# Patient Record
Sex: FEMALE | Race: WHITE | NOT HISPANIC OR LATINO | Employment: UNEMPLOYED | URBAN - METROPOLITAN AREA
[De-identification: names, ages, dates, MRNs, and addresses within clinical notes are randomized per-mention and may not be internally consistent; named-entity substitution may affect disease eponyms.]

---

## 2017-01-02 ENCOUNTER — HOSPITAL ENCOUNTER (OUTPATIENT)
Dept: RADIOLOGY | Facility: MEDICAL CENTER | Age: 37
End: 2017-01-02

## 2017-01-03 ENCOUNTER — HOSPITAL ENCOUNTER (OUTPATIENT)
Dept: RADIOLOGY | Facility: MEDICAL CENTER | Age: 37
End: 2017-01-03

## 2017-01-16 ENCOUNTER — APPOINTMENT (OUTPATIENT)
Dept: INTERNAL MEDICINE | Facility: MEDICAL CENTER | Age: 37
End: 2017-01-16
Payer: MEDICAID

## 2017-02-01 ENCOUNTER — OFFICE VISIT (OUTPATIENT)
Dept: INTERNAL MEDICINE | Facility: MEDICAL CENTER | Age: 37
End: 2017-02-01
Payer: MEDICAID

## 2017-02-01 VITALS
SYSTOLIC BLOOD PRESSURE: 131 MMHG | HEIGHT: 70 IN | BODY MASS INDEX: 32.78 KG/M2 | DIASTOLIC BLOOD PRESSURE: 87 MMHG | OXYGEN SATURATION: 96 % | TEMPERATURE: 99.5 F | HEART RATE: 93 BPM | WEIGHT: 229 LBS

## 2017-02-01 DIAGNOSIS — M87.00 AVASCULAR NECROSIS OF BONE (HCC): ICD-10-CM

## 2017-02-01 PROCEDURE — 99204 OFFICE O/P NEW MOD 45 MIN: CPT | Mod: GC | Performed by: INTERNAL MEDICINE

## 2017-02-01 RX ORDER — DEXAMETHASONE 0.5 MG/1
1 TABLET ORAL ONCE
Qty: 2 TAB | Refills: 0 | Status: SHIPPED | OUTPATIENT
Start: 2017-02-01 | End: 2017-02-01

## 2017-02-01 RX ORDER — OXYCODONE AND ACETAMINOPHEN 10; 325 MG/1; MG/1
1-2 TABLET ORAL EVERY 4 HOURS PRN
COMMUNITY
End: 2018-06-22

## 2017-02-01 ASSESSMENT — PATIENT HEALTH QUESTIONNAIRE - PHQ9
5. POOR APPETITE OR OVEREATING: 3 - NEARLY EVERY DAY
SUM OF ALL RESPONSES TO PHQ QUESTIONS 1-9: 24
CLINICAL INTERPRETATION OF PHQ2 SCORE: 6

## 2017-02-01 ASSESSMENT — PAIN SCALES - GENERAL: PAINLEVEL: 7=MODERATE-SEVERE PAIN

## 2017-02-01 NOTE — PATIENT INSTRUCTIONS
"Stress  Stress-related medical problems are becoming increasingly common.  The body has a built-in physical response to stressful situations. Faced with pressure, challenge or danger, we need to react quickly. Our bodies release hormones such as cortisol and adrenaline to help do this. These hormones are part of the \"fight or flight\" response and affect the metabolic rate, heart rate and blood pressure, resulting in a heightened, stressed state that prepares the body for optimum performance in dealing with a stressful situation.  It is likely that early man required these mechanisms to stay alive, but usually modern stresses do not call for this, and the same hormones released in today's world can damage health and reduce coping ability.  CAUSES  · Pressure to perform at work, at school or in sports.  · Threats of physical violence.  · Money worries.  · Arguments.  · Family conflicts.  · Divorce or separation from significant other.  · Bereavement.  · New job or unemployment.  · Changes in location.  · Alcohol or drug abuse.  SOMETIMES, THERE IS NO PARTICULAR REASON FOR DEVELOPING STRESS.  Almost all people are at risk of being stressed at some time in their lives. It is important to know that some stress is temporary and some is long term.  · Temporary stress will go away when a situation is resolved. Most people can cope with short periods of stress, and it can often be relieved by relaxing, taking a walk, chatting through issues with friends, or having a good night's sleep.  · Chronic (long-term, continuous) stress is much harder to deal with. It can be psychologically and emotionally damaging. It can be harmful both for an individual and for friends and family.  SYMPTOMS  Everyone reacts to stress differently. There are some common effects that help us recognize it. In times of extreme stress, people may:  · Shake uncontrollably.  · Breathe faster and deeper than normal (hyperventilate).  · Vomit.  · For people " with asthma, stress can trigger an attack.  · For some people, stress may trigger migraine headaches, ulcers, and body pain.  PHYSICAL EFFECTS OF STRESS MAY INCLUDE:  · Loss of energy.  · Skin problems.  · Aches and pains resulting from tense muscles, including neck ache, backache and tension headaches.  · Increased pain from arthritis and other conditions.  · Irregular heart beat (palpitations).  · Periods of irritability or anger.  · Apathy or depression.  · Anxiety (feeling uptight or worrying).  · Unusual behavior.  · Loss of appetite.  · Comfort eating.  · Lack of concentration.  · Loss of, or decreased, sex-drive.  · Increased smoking, drinking, or recreational drug use.  · For women, missed periods.  · Ulcers, joint pain, and muscle pain.  Post-traumatic stress is the stress caused by any serious accident, strong emotional damage, or extremely difficult or violent experience such as rape or war.  Post-traumatic stress victims can experience mixtures of emotions such as fear, shame, depression, guilt or anger. It may include recurrent memories or images that may be haunting. These feelings can last for weeks, months or even years after the traumatic event that triggered them. Specialized treatment, possibly with medicines and psychological therapies, is available.  If stress is causing physical symptoms, severe distress or making it difficult for you to function as normal, it is worth seeing your caregiver. It is important to remember that although stress is a usual part of life, extreme or prolonged stress can lead to other illnesses that will need treatment. It is better to visit a doctor sooner rather than later. Stress has been linked to the development of high blood pressure and heart disease, as well as insomnia and depression.  There is no diagnostic test for stress since everyone reacts to it differently. But a caregiver will be able to spot the physical symptoms, such  as:  · Headaches.  · Shingles.  · Ulcers.  Emotional distress such as intense worry, low mood or irritability should be detected when the doctor asks pertinent questions to identify any underlying problems that might be the cause. In case there are physical reasons for the symptoms, the doctor may also want to do some tests to exclude certain conditions.  If you feel that you are suffering from stress, try to identify the aspects of your life that are causing it. Sometimes you may not be able to change or avoid them, but even a small change can have a positive ripple effect. A simple lifestyle change can make all the difference.  STRATEGIES THAT CAN HELP DEAL WITH STRESS:  · Delegating or sharing responsibilities.  · Avoiding confrontations.  · Learning to be more assertive.  · Regular exercise.  · Avoid using alcohol or street drugs to cope.  · Eating a healthy, balanced diet, rich in fruit and vegetables and proteins.  · Finding humor or absurdity in stressful situations.  · Never taking on more than you know you can handle comfortably.  · Organizing your time better to get as much done as possible.  · Talking to friends or family and sharing your thoughts and fears.  · Listening to music or relaxation tapes.  · Tensing and then relaxing your muscles, starting at the toes and working up to the head and neck.  If you think that you would benefit from help, either in identifying the things that are causing your stress or in learning techniques to help you relax, see a caregiver who is capable of helping you with this. Rather than relying on medications, it is usually better to try and identify the things in your life that are causing stress and try to deal with them.  There are many techniques of managing stress including counseling, psychotherapy, aromatherapy, yoga, and exercise. Your caregiver can help you determine what is best for you.  Document Released: 03/09/2004 Document Revised: 03/11/2013 Document  Reviewed: 02/03/2009  ExitCare® Patient Information ©2014 [employer].
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
## 2017-02-01 NOTE — MR AVS SNAPSHOT
"Vivian Ptaeling   2017 2:15 PM   Office Visit   MRN: [id number]    Department:  [facility] - Internal Med   Dept Phone:  [phone]    Description:  Female : 1980   Provider:  Izzy Ann M.D.           Reason for Visit     New Patient anxiety/HTN/cough/depression      Allergies as of 2017     Allergen Noted Reactions    Ativan 2017       Black out     Haldol [Haloperidol] 2017       Black out     Pcn [Penicillins] 2009       Pcn [Penicillins] 2009         You were diagnosed with     Avascular necrosis of bone (CMS-HCC)   [504587]         Vital Signs     Blood Pressure Pulse Temperature Height Weight Body Mass Index    131/87 mmHg 93 37.5 °C (99.5 °F) 1.778 m (5' 10\") 103.874 kg (229 lb) 32.86 kg/m2    Oxygen Saturation Breastfeeding? Smoking Status             96% No Current Every Day Smoker         Basic Information     Date Of Birth Sex Race Ethnicity Preferred Language    1980 Female White Unknown English      Your appointments     Mar 08, 2017  1:15 PM   Established Patient with Izzy Ann M.D.   [facility] - Internal Medicine (--)    [address]  [address] [address] [phone]           You will be receiving a confirmation call a few days before your appointment from our automated call confirmation system.              Problem List              ICD-10-CM Priority Class Noted - Resolved    Avascular necrosis of bone (CMS-HCC) M87.00   2017 - Present      Health Maintenance        Date Due Completion Dates    IMM DTaP/Tdap/Td Vaccine (6 - Tdap) 3/27/1997 3/26/1997, 3/17/1987, 1984, 1983, 1/3/1983, 1980    PAP SMEAR 2003 ---    IMM INFLUENZA (1) 2016 ---            Current Immunizations     Dtap Vaccine 3/17/1987, 1984, 1983, 1/3/1983, 1980    HIB Vaccine(PEDVAX) 1986    MMR Vaccine 3/26/1997, 1983    OPV - Historical Data 3/17/1987, 1983, 1/3/1983, " 1980    TD Vaccine 3/26/1997      Below and/or attached are the medications your provider expects you to take. Review all of your home medications and newly ordered medications with your provider and/or pharmacist. Follow medication instructions as directed by your provider and/or pharmacist. Please keep your medication list with you and share with your provider. Update the information when medications are discontinued, doses are changed, or new medications (including over-the-counter products) are added; and carry medication information at all times in the event of emergency situations     Allergies:  ATIVAN - (reactions not documented)     HALDOL - (reactions not documented)     PCN - (reactions not documented)     PCN - (reactions not documented)               Medications  Valid as of: February 01, 2017 -  3:21 PM    Generic Name Brand Name Tablet Size Instructions for use    ALPRAZolam (Tab) XANAX 2 MG Take 2 mg by mouth at bedtime as needed for Sleep.        Dexamethasone (Tab) DECADRON 0.5 MG Take 2 Tabs by mouth Once for 1 dose.        Diclofenac Sodium (Gel) Diclofenac Sodium 1 % Apply  to skin as directed.        Oxycodone-Acetaminophen (Tab) PERCOCET-10  MG Take 1-2 Tabs by mouth every four hours as needed for Severe Pain.        .                 Medicines prescribed today were sent to:     [facility] [address] [place] [id number]    Phone: [phone] Fax: [phone]    Open 24 Hours?: No      Medication refill instructions:       If your prescription bottle indicates you have medication refills left, it is not necessary to call your provider’s office. Please contact your pharmacy and they will refill your medication.    If your prescription bottle indicates you do not have any refills left, you may request refills at any time through one of the following ways: The online [employer] system (except Urgent Care), by calling your provider’s  "office, or by asking your pharmacy to contact your provider’s office with a refill request. Medication refills are processed only during regular business hours and may not be available until the next business day. Your provider may request additional information or to have a follow-up visit with you prior to refilling your medication.   *Please Note: Medication refills are assigned a new Rx number when refilled electronically. Your pharmacy may indicate that no refills were authorized even though a new prescription for the same medication is available at the pharmacy. Please request the medicine by name with the pharmacy before contacting your provider for a refill.        Referral     A referral request has been sent to our patient care coordination department. Please allow 3-5 business days for us to process this request and contact you either by phone or mail. If you do not hear from us by the 5th business day, please call us at [phone].        Instructions    Stress  Stress-related medical problems are becoming increasingly common.  The body has a built-in physical response to stressful situations. Faced with pressure, challenge or danger, we need to react quickly. Our bodies release hormones such as cortisol and adrenaline to help do this. These hormones are part of the \"fight or flight\" response and affect the metabolic rate, heart rate and blood pressure, resulting in a heightened, stressed state that prepares the body for optimum performance in dealing with a stressful situation.  It is likely that early man required these mechanisms to stay alive, but usually modern stresses do not call for this, and the same hormones released in today's world can damage health and reduce coping ability.  CAUSES  · Pressure to perform at work, at school or in sports.  · Threats of physical violence.  · Money worries.  · Arguments.  · Family conflicts.  · Divorce or separation from significant " other.  · Bereavement.  · New job or unemployment.  · Changes in location.  · Alcohol or drug abuse.  SOMETIMES, THERE IS NO PARTICULAR REASON FOR DEVELOPING STRESS.  Almost all people are at risk of being stressed at some time in their lives. It is important to know that some stress is temporary and some is long term.  · Temporary stress will go away when a situation is resolved. Most people can cope with short periods of stress, and it can often be relieved by relaxing, taking a walk, chatting through issues with friends, or having a good night's sleep.  · Chronic (long-term, continuous) stress is much harder to deal with. It can be psychologically and emotionally damaging. It can be harmful both for an individual and for friends and family.  SYMPTOMS  Everyone reacts to stress differently. There are some common effects that help us recognize it. In times of extreme stress, people may:  · Shake uncontrollably.  · Breathe faster and deeper than normal (hyperventilate).  · Vomit.  · For people with asthma, stress can trigger an attack.  · For some people, stress may trigger migraine headaches, ulcers, and body pain.  PHYSICAL EFFECTS OF STRESS MAY INCLUDE:  · Loss of energy.  · Skin problems.  · Aches and pains resulting from tense muscles, including neck ache, backache and tension headaches.  · Increased pain from arthritis and other conditions.  · Irregular heart beat (palpitations).  · Periods of irritability or anger.  · Apathy or depression.  · Anxiety (feeling uptight or worrying).  · Unusual behavior.  · Loss of appetite.  · Comfort eating.  · Lack of concentration.  · Loss of, or decreased, sex-drive.  · Increased smoking, drinking, or recreational drug use.  · For women, missed periods.  · Ulcers, joint pain, and muscle pain.  Post-traumatic stress is the stress caused by any serious accident, strong emotional damage, or extremely difficult or violent experience such as rape or war.  Post-traumatic stress  victims can experience mixtures of emotions such as fear, shame, depression, guilt or anger. It may include recurrent memories or images that may be haunting. These feelings can last for weeks, months or even years after the traumatic event that triggered them. Specialized treatment, possibly with medicines and psychological therapies, is available.  If stress is causing physical symptoms, severe distress or making it difficult for you to function as normal, it is worth seeing your caregiver. It is important to remember that although stress is a usual part of life, extreme or prolonged stress can lead to other illnesses that will need treatment. It is better to visit a doctor sooner rather than later. Stress has been linked to the development of high blood pressure and heart disease, as well as insomnia and depression.  There is no diagnostic test for stress since everyone reacts to it differently. But a caregiver will be able to spot the physical symptoms, such as:  · Headaches.  · Shingles.  · Ulcers.  Emotional distress such as intense worry, low mood or irritability should be detected when the doctor asks pertinent questions to identify any underlying problems that might be the cause. In case there are physical reasons for the symptoms, the doctor may also want to do some tests to exclude certain conditions.  If you feel that you are suffering from stress, try to identify the aspects of your life that are causing it. Sometimes you may not be able to change or avoid them, but even a small change can have a positive ripple effect. A simple lifestyle change can make all the difference.  STRATEGIES THAT CAN HELP DEAL WITH STRESS:  · Delegating or sharing responsibilities.  · Avoiding confrontations.  · Learning to be more assertive.  · Regular exercise.  · Avoid using alcohol or street drugs to cope.  · Eating a healthy, balanced diet, rich in fruit and vegetables and proteins.  · Finding humor or absurdity in  stressful situations.  · Never taking on more than you know you can handle comfortably.  · Organizing your time better to get as much done as possible.  · Talking to friends or family and sharing your thoughts and fears.  · Listening to music or relaxation tapes.  · Tensing and then relaxing your muscles, starting at the toes and working up to the head and neck.  If you think that you would benefit from help, either in identifying the things that are causing your stress or in learning techniques to help you relax, see a caregiver who is capable of helping you with this. Rather than relying on medications, it is usually better to try and identify the things in your life that are causing stress and try to deal with them.  There are many techniques of managing stress including counseling, psychotherapy, aromatherapy, yoga, and exercise. Your caregiver can help you determine what is best for you.  Document Released: 03/09/2004 Document Revised: 03/11/2013 Document Reviewed: 02/03/2009  ExitCare® Patient Information ©2014 [employer].            [employer] Access Code: Activation code not generated  Current ModusP Status: Active

## 2017-02-13 DIAGNOSIS — D68.59 HYPERCOAGULABLE STATE (HCC): ICD-10-CM

## 2017-03-08 ENCOUNTER — OFFICE VISIT (OUTPATIENT)
Dept: HEMATOLOGY ONCOLOGY | Facility: MEDICAL CENTER | Age: 37
End: 2017-03-08
Payer: MEDICAID

## 2017-03-08 ENCOUNTER — OFFICE VISIT (OUTPATIENT)
Dept: INTERNAL MEDICINE | Facility: MEDICAL CENTER | Age: 37
End: 2017-03-08
Payer: MEDICAID

## 2017-03-08 VITALS
SYSTOLIC BLOOD PRESSURE: 142 MMHG | TEMPERATURE: 99.3 F | DIASTOLIC BLOOD PRESSURE: 102 MMHG | HEART RATE: 108 BPM | BODY MASS INDEX: 34.28 KG/M2 | OXYGEN SATURATION: 94 % | HEIGHT: 68 IN | RESPIRATION RATE: 12 BRPM | WEIGHT: 226.2 LBS

## 2017-03-08 VITALS
DIASTOLIC BLOOD PRESSURE: 83 MMHG | OXYGEN SATURATION: 94 % | BODY MASS INDEX: 32.78 KG/M2 | TEMPERATURE: 99.2 F | HEART RATE: 98 BPM | HEIGHT: 70 IN | WEIGHT: 229 LBS | SYSTOLIC BLOOD PRESSURE: 119 MMHG | RESPIRATION RATE: 20 BRPM

## 2017-03-08 DIAGNOSIS — M87.00 AVASCULAR NECROSIS OF BONE (HCC): ICD-10-CM

## 2017-03-08 DIAGNOSIS — M87.9 OSTEONECROSIS (HCC): ICD-10-CM

## 2017-03-08 PROCEDURE — 99214 OFFICE O/P EST MOD 30 MIN: CPT | Mod: GC | Performed by: INTERNAL MEDICINE

## 2017-03-08 PROCEDURE — 99245 OFF/OP CONSLTJ NEW/EST HI 55: CPT | Performed by: INTERNAL MEDICINE

## 2017-03-08 RX ORDER — ONDANSETRON HYDROCHLORIDE 8 MG/1
TABLET, FILM COATED ORAL
Status: ON HOLD | COMMUNITY
Start: 2016-12-12 | End: 2017-03-14

## 2017-03-08 RX ORDER — ASPIRIN 81 MG/1
TABLET, CHEWABLE ORAL
Status: ON HOLD | COMMUNITY
Start: 2017-01-10 | End: 2017-03-14

## 2017-03-08 RX ORDER — LITHIUM CARBONATE 300 MG/1
CAPSULE ORAL
Status: ON HOLD | COMMUNITY
Start: 2016-12-10 | End: 2017-03-14

## 2017-03-08 RX ORDER — PROCHLORPERAZINE MALEATE 10 MG
TABLET ORAL
COMMUNITY
Start: 2017-02-09

## 2017-03-08 RX ORDER — IPRATROPIUM BROMIDE AND ALBUTEROL SULFATE 2.5; .5 MG/3ML; MG/3ML
SOLUTION RESPIRATORY (INHALATION)
Status: ON HOLD | COMMUNITY
Start: 2017-01-10 | End: 2017-03-14

## 2017-03-08 RX ORDER — SODIUM CHLORIDE 9 MG/ML
INJECTION, SOLUTION INTRAVENOUS
COMMUNITY
Start: 2017-01-10 | End: 2017-04-13

## 2017-03-08 RX ORDER — AZITHROMYCIN 250 MG/1
TABLET, FILM COATED ORAL
Status: ON HOLD | COMMUNITY
Start: 2017-01-10 | End: 2017-03-14

## 2017-03-08 RX ORDER — GABAPENTIN 800 MG/1
TABLET ORAL
Status: ON HOLD | COMMUNITY
Start: 2016-12-10 | End: 2017-03-14

## 2017-03-08 RX ORDER — DOXYCYCLINE HYCLATE 100 MG/1
CAPSULE ORAL
Status: ON HOLD | COMMUNITY
Start: 2017-02-09 | End: 2017-03-14

## 2017-03-08 ASSESSMENT — PAIN SCALES - GENERAL
PAINLEVEL: 5=MODERATE PAIN
PAINLEVEL: 5=MODERATE PAIN

## 2017-03-08 NOTE — MR AVS SNAPSHOT
"Vivian Pateling   3/8/2017 1:15 PM   Office Visit   MRN: [id number]    Department:  [facility]   Dept Phone:  [phone]    Description:  Female : 1980   Provider:  Izzy Ann M.D.           Reason for Visit     Follow-Up           Allergies as of 3/8/2017     Allergen Noted Reactions    Ativan 2017       Black out     Haldol [Haloperidol] 2017       Black out     Pcn [Penicillins] 2009       Pcn [Penicillins] 2009         You were diagnosed with     Avascular necrosis of bone (CMS-HCC)   [559165]         Vital Signs     Blood Pressure Pulse Temperature Respirations Height Weight    119/83 mmHg 98 37.3 °C (99.2 °F) 20 1.778 m (5' 10\") 103.874 kg (229 lb)    Body Mass Index Oxygen Saturation Smoking Status             32.86 kg/m2 94% Current Every Day Smoker         Basic Information     Date Of Birth Sex Race Ethnicity Preferred Language    1980 Female White Unknown English      Your appointments     Mar 08, 2017  3:00 PM   ONCOLOGY NEW PATIENT 60 MIN with David Hall M.D.   [facility] (--)    [address] [place] [address], [address]  [facility] [address] [phone]              Problem List              ICD-10-CM Priority Class Noted - Resolved    Avascular necrosis of bone (CMS-HCC) M87.00   2017 - Present      Health Maintenance        Date Due Completion Dates    IMM DTaP/Tdap/Td Vaccine (6 - Tdap) 3/27/1997 3/26/1997, 3/17/1987, 1984, 1983, 1/3/1983, 1982    PAP SMEAR 2003 ---    IMM INFLUENZA (1) 2016 ---            Current Immunizations     Dtap Vaccine 3/17/1987, 1984, 1983, 1/3/1983, 1982    HIB Vaccine(PEDVAX) 1986    MMR Vaccine 3/26/1997, 1983    OPV - Historical Data 3/17/1987, 1983, 1/3/1983, 1982    TD Vaccine 3/26/1997      Below and/or attached are the medications your provider expects you to take. Review all of your home medications and newly ordered medications with your " provider and/or pharmacist. Follow medication instructions as directed by your provider and/or pharmacist. Please keep your medication list with you and share with your provider. Update the information when medications are discontinued, doses are changed, or new medications (including over-the-counter products) are added; and carry medication information at all times in the event of emergency situations     Allergies:  ATIVAN - (reactions not documented)     HALDOL - (reactions not documented)     PCN - (reactions not documented)     PCN - (reactions not documented)               Medications  Valid as of: March 08, 2017 -  2:00 PM    Generic Name Brand Name Tablet Size Instructions for use    ALPRAZolam (Tab) XANAX 2 MG Take 2 mg by mouth at bedtime as needed for Sleep.        Oxycodone-Acetaminophen (Tab) PERCOCET-10  MG Take 1-2 Tabs by mouth every four hours as needed for Severe Pain.        .                 Medicines prescribed today were sent to:     [facility] [address] [place] [address]    Phone: [phone] Fax: [phone]    Open 24 Hours?: No      Medication refill instructions:       If your prescription bottle indicates you have medication refills left, it is not necessary to call your provider’s office. Please contact your pharmacy and they will refill your medication.    If your prescription bottle indicates you do not have any refills left, you may request refills at any time through one of the following ways: The online [employer] system (except Urgent Care), by calling your provider’s office, or by asking your pharmacy to contact your provider’s office with a refill request. Medication refills are processed only during regular business hours and may not be available until the next business day. Your provider may request additional information or to have a follow-up visit with you prior to refilling your medication.   *Please Note:  Medication refills are assigned a new Rx number when refilled electronically. Your pharmacy may indicate that no refills were authorized even though a new prescription for the same medication is available at the pharmacy. Please request the medicine by name with the pharmacy before contacting your provider for a refill.        Instructions    Pain Medicine Instructions  HOW CAN PAIN MEDICINE AFFECT ME?  You were given a prescription for pain medicine. This medicine may make you tired or drowsy and may affect your ability to think clearly. Pain medicine may also affect your ability to drive or perform certain physical activities. It may not be possible to make all of your pain go away, but you should be comfortable enough to move, breathe, and take care of yourself.  HOW OFTEN SHOULD I TAKE PAIN MEDICINE AND HOW MUCH SHOULD I TAKE?  · Take pain medicine only as directed by your health care provider and only as needed for pain.  · You do not need to take pain medicine if you are not having pain, unless directed by your health care provider.  · You can take less than the prescribed dose if you find that a smaller amount of medicine controls your pain.  WHAT RESTRICTIONS DO I HAVE WHILE TAKING PAIN MEDICINE?  Follow these instructions after you start taking pain medicine, while you are taking the medicine, and for 8 hours after you stop taking the medicine:  · Do not drive.  · Do not operate machinery.  · Do not operate power tools.  · Do not sign legal documents.  · Do not drink alcohol.  · Do not take sleeping pills.  · Do not supervise children by yourself.  · Do not participate in activities that require climbing or being in high places.  · Do not enter a body of water--such as a lake, river, ocean, spa, or swimming pool--without an adult nearby who can monitor and help you.  HOW CAN I KEEP OTHERS SAFE WHILE I AM TAKING PAIN MEDICINE?  · Store your pain medicine as directed by your health care provider. Make sure  that it is placed where children and pets cannot reach it.  · Never share your pain medicine with anyone.  · Do not save any leftover pills. If you have any leftover pain medicine, get rid of it or destroy it as directed by your health care provider.  WHAT ELSE DO I NEED TO KNOW ABOUT TAKING PAIN MEDICINE?  · Use a stool softener if you become constipated from your pain medicine. Increasing your intake of fruits and vegetables will also help with constipation.  · Write down the times when you take your pain medicine. Look at the times before you take your next dose of medicine. It is easy to become confused while on pain medicine. Recording the times helps you to avoid an overdose.  · If your pain is severe, do not try to treat it yourself by taking more pills than instructed on your prescription. Contact your health care provider for help.  · You may have been prescribed a pain medicine that contains acetaminophen. Do not take any other acetaminophen while taking this medicine. An overdose of acetaminophen can result in severe liver damage. Acetaminophen is found in many over-the-counter (OTC) and prescription medicines. If you are taking any medicines in addition to your pain medicine, check the active ingredients on those medicines to see if acetaminophen is listed.  WHEN SHOULD I CALL MY HEALTH CARE PROVIDER?  · Your medicine is not helping to make the pain go away.  · You vomit or have diarrhea shortly after taking the medicine.  · You develop new pain in areas that did not hurt before.  · You have an allergic reaction to your medicine. This may include:  ¨ Itchiness.  ¨ Swelling.  ¨ Dizziness.  ¨ Developing a new rash.  WHEN SHOULD I CALL 911 OR GO TO THE EMERGENCY ROOM?  · You feel dizzy or you faint.  · You are very confused or disoriented.  · You repeatedly vomit.  · Your skin or lips turn pale or bluish in color.  · You have shortness of breath or you are breathing much more slowly than usual.  · You have  a severe allergic reaction to your medicine. This includes:  ¨ Developing tongue swelling.  ¨ Having difficulty breathing.     This information is not intended to replace advice given to you by your health care provider. Make sure you discuss any questions you have with your health care provider.     Document Released: 03/26/2002 Document Revised: 05/03/2016 Document Reviewed: 10/22/2015  [employer] Interactive Patient Education ©2016 Elsevier Inc.            [employer] Access Code: Activation code not generated  Current [employer] Status: Active          Quit Tobacco Information     Do you want to quit using tobacco?    Quitting tobacco decreases risks of cancer, heart and lung disease, increases life expectancy, improves sense of taste and smell, and increases spending money, among other benefits.    If you are thinking about quitting, we can help.  • [facility] Quit Tobacco Program: 715.390.3468  o Program occurs weekly for four weeks and includes pharmacist consultation on products to support quitting smoking or chewing tobacco. A provider referral is needed for pharmacist consultation.  • Tobacco Users Help Hotline: 2-833-QUITNOW (376-9972) or https://[place].quitlogix.org/  o Free, confidential telephone and online coaching for [place] residents. Sessions are designed on a schedule that is convenient for you. Eligible clients receive free nicotine replacement therapy.  • Nationally: www.smokefree.gov  o Information and professional assistance to support both immediate and long-term needs as you become, and remain, a non-smoker. Smokefree.gov allows you to choose the help that best fits your needs.

## 2017-03-08 NOTE — PROGRESS NOTES
"      Established Patient    Vivian Meredith presents today with the following:    CC: Follow-up for avascular necrosis    HPI: 36-year-old female here for follow-up of avascular necrosis of unknown cause. Please refer to previous office visit note for further details. Patient has a long-standing history of avascular necrosis and has seen multiple specialists and has had extensive workup done in the past which is failed to elicit any cause. Last time she was in the office we ordered PTH and dexamethasone suppression test which has been done but we don't have the results yet. She has an appointment with hematologist at 3 PM today. She denies anything new that has happened in the interim. The only new thing is that she's been more drowsy than before. She is on the same pain medication regimen that she was on before but for some reason she has been feeling more sleepy especially after she eats meals.    Patient Active Problem List    Diagnosis Date Noted   • Avascular necrosis of bone (CMS-HCC) 02/01/2017       Current Outpatient Prescriptions   Medication Sig Dispense Refill   • oxycodone-acetaminophen (PERCOCET-10)  MG Tab Take 1-2 Tabs by mouth every four hours as needed for Severe Pain.     • alprazolam (XANAX) 2 MG tablet Take 2 mg by mouth at bedtime as needed for Sleep.       No current facility-administered medications for this visit.       ROS: As per HPI.     /83 mmHg  Pulse 98  Temp(Src) 37.3 °C (99.2 °F)  Resp 20  Ht 1.778 m (5' 10\")  Wt 103.874 kg (229 lb)  BMI 32.86 kg/m2  SpO2 94%    Physical Exam   Constitutional:  oriented to person, place, and time. Very drowsy.  Cardiovascular: Normal rate, regular rhythm and normal heart sounds.  Exam reveals no gallop and no friction rub.  No murmur heard.  Pulmonary/Chest: Breath sounds normal. Chest wall is not dull to percussion.     Assessment and Plan    1. Avascular necrosis of bone (CMS-HCC)  Will wait for test results (PTH and dexamethasone " suppression test).  Has appointment with hematology. Would appreciate their recommendations. It does appear that she has some clotting disorder that is causing body wide bone infarcts. She also mentioned today that she had difficulty getting her blood drawn because her blood would keep clotting in the tubes.      Followup: Schedule follow-up once we have her test results back.      Signed by: Izzy Ann M.D.

## 2017-03-08 NOTE — MR AVS SNAPSHOT
"        Vivian Patelmeera   3/8/2017 3:00 PM   Office Visit   MRN: [id number]    Department:  [facility]   Dept Phone:  [phone]    Description:  Female : 1980   Provider:  David Hall M.D.           Reason for Visit     Establish Care           Allergies as of 3/8/2017     Allergen Noted Reactions    Ativan 2017       Black out     Haldol [Haloperidol] 2017       Black out     Pcn [Penicillins] 2009       Pcn [Penicillins] 2009         You were diagnosed with     Osteonecrosis (CMS-[facility])   [004153]         Vital Signs     Blood Pressure Pulse Temperature Respirations Height Weight    142/102 mmHg 108 37.4 °C (99.3 °F) 12 1.727 m (5' 8\") 102.604 kg (226 lb 3.2 oz)    Body Mass Index Oxygen Saturation Smoking Status             34.40 kg/m2 94% Current Every Day Smoker         Basic Information     Date Of Birth Sex Race Ethnicity Preferred Language    1980 Female White Unknown English      Your appointments     Mar 28, 2017  2:40 PM   ONCOLOGY EST PATIENT 30 MIN with David Hall M.D.   [facility] (--)    [address] [place] [address], [address]  [facility] [address] [phone]              Problem List              ICD-10-CM Priority Class Noted - Resolved    Avascular necrosis of bone (CMS-[facility]) M87.00   2017 - Present      Health Maintenance        Date Due Completion Dates    IMM DTaP/Tdap/Td Vaccine (6 - Tdap) 3/27/1997 3/26/1997, 3/17/1987, 1984, 1983, 1/3/1983, 1982    PAP SMEAR 2003 ---    IMM INFLUENZA (1) 2016 ---            Current Immunizations     Dtap Vaccine 3/17/1987, 1984, 1983, 1/3/1983, 1982    HIB Vaccine(PEDVAX) 1986    MMR Vaccine 3/26/1997, 1983    OPV - Historical Data 3/17/1987, 1983, 1/3/1983, 1982    TD Vaccine 3/26/1997      Below and/or attached are the medications your provider expects you to take. Review all of your home medications and newly ordered medications with your " provider and/or pharmacist. Follow medication instructions as directed by your provider and/or pharmacist. Please keep your medication list with you and share with your provider. Update the information when medications are discontinued, doses are changed, or new medications (including over-the-counter products) are added; and carry medication information at all times in the event of emergency situations     Allergies:  ATIVAN - (reactions not documented)     HALDOL - (reactions not documented)     PCN - (reactions not documented)     PCN - (reactions not documented)               Medications  Valid as of: March 08, 2017 -  5:01 PM    Generic Name Brand Name Tablet Size Instructions for use    ALPRAZolam (Tab) XANAX 2 MG Take 2 mg by mouth at bedtime as needed for Sleep.        Aspirin (Chew Tab) ASPIRIN LOW STRENGTH 81 MG         Azithromycin (Tab) ZITHROMAX 250 MG         Diclofenac Sodium (Gel) VOLTAREN 1 %         Doxycycline Hyclate (Cap) VIBRAMYCIN 100 MG         Gabapentin (Tab) NEURONTIN 800 MG         Hydrocod Polst-Chlorphen Polst (Suspension Extended Release) TUSSIONEX 10-8 MG/5ML         Ipratropium-Albuterol (Solution) DUONEB 0.5-2.5 (3) MG/3ML         Lithium Carbonate (Cap) lithium carbonate 300 MG         Ondansetron HCl (Tab) ZOFRAN 8 MG         Oxycodone-Acetaminophen (Tab) PERCOCET-10  MG Take 1-2 Tabs by mouth every four hours as needed for Severe Pain.        Prochlorperazine Maleate (Tab) COMPAZINE 10 MG         Sodium Chloride (Solution) NS          .                 Medicines prescribed today were sent to:     [facility] [address] [place] [address]    Phone: [phone] Fax: [phone]    Open 24 Hours?: No      Medication refill instructions:       If your prescription bottle indicates you have medication refills left, it is not necessary to call your provider’s office. Please contact your pharmacy and they will refill your  medication.    If your prescription bottle indicates you do not have any refills left, you may request refills at any time through one of the following ways: The online [employer] system (except Urgent Care), by calling your provider’s office, or by asking your pharmacy to contact your provider’s office with a refill request. Medication refills are processed only during regular business hours and may not be available until the next business day. Your provider may request additional information or to have a follow-up visit with you prior to refilling your medication.   *Please Note: Medication refills are assigned a new Rx number when refilled electronically. Your pharmacy may indicate that no refills were authorized even though a new prescription for the same medication is available at the pharmacy. Please request the medicine by name with the pharmacy before contacting your provider for a refill.        Your To Do List     Future Labs/Procedures Complete By Expires    ANTICARDIOLIPIN AB IGG,IGM,IGA  As directed 3/8/2018    SPEP W/REFLEX TO NELIA, A, G, M  As directed 3/8/2018    WESTERGREN SED RATE  As directed 3/8/2018         [employer] Access Code: Activation code not generated  Current [employer] Status: Active          Quit Tobacco Information     Do you want to quit using tobacco?    Quitting tobacco decreases risks of cancer, heart and lung disease, increases life expectancy, improves sense of taste and smell, and increases spending money, among other benefits.    If you are thinking about quitting, we can help.  • [facility] Quit Tobacco Program: 143.797.9532  o Program occurs weekly for four weeks and includes pharmacist consultation on products to support quitting smoking or chewing tobacco. A provider referral is needed for pharmacist consultation.  • Tobacco Users Help Hotline: 3-658-QUIT-NOW (768-0974) or https://[place].quitlogix.org/  o Free, confidential telephone and online coaching for [place] residents. Sessions  are designed on a schedule that is convenient for you. Eligible clients receive free nicotine replacement therapy.  • Nationally: www.smokefree.gov  o Information and professional assistance to support both immediate and long-term needs as you become, and remain, a non-smoker. Smokefree.gov allows you to choose the help that best fits your needs.

## 2017-03-09 DIAGNOSIS — M87.00 AVASCULAR NECROSIS OF BONE (HCC): ICD-10-CM
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
## 2017-03-14 ENCOUNTER — RESOLUTE PROFESSIONAL BILLING HOSPITAL PROF FEE (OUTPATIENT)
Dept: HOSPITALIST | Facility: MEDICAL CENTER | Age: 37
End: 2017-03-14
Payer: MEDICAID

## 2017-03-14 ENCOUNTER — HOSPITAL ENCOUNTER (OUTPATIENT)
Facility: MEDICAL CENTER | Age: 37
End: 2017-03-14
Attending: HOSPITALIST | Admitting: HOSPITALIST
Payer: MEDICAID

## 2017-03-14 VITALS
HEART RATE: 87 BPM | RESPIRATION RATE: 16 BRPM | OXYGEN SATURATION: 94 % | BODY MASS INDEX: 32.95 KG/M2 | HEIGHT: 70 IN | WEIGHT: 230.16 LBS | TEMPERATURE: 97.6 F

## 2017-03-14 LAB
BASOPHILS # BLD AUTO: 0.5 % (ref 0–1.8)
BASOPHILS # BLD: 0.03 K/UL (ref 0–0.12)
EOSINOPHIL # BLD AUTO: 0.26 K/UL (ref 0–0.51)
EOSINOPHIL NFR BLD: 4.3 % (ref 0–6.9)
ERYTHROCYTE [DISTWIDTH] IN BLOOD BY AUTOMATED COUNT: 46.3 FL (ref 35.9–50)
HCG SERPL QL: NEGATIVE
HCT VFR BLD AUTO: 42.9 % (ref 37–47)
HGB BLD-MCNC: 14.5 G/DL (ref 12–16)
HGB RETIC QN AUTO: 39.1 PG/CELL (ref 29–35)
IMM GRANULOCYTES # BLD AUTO: 0.02 K/UL (ref 0–0.11)
IMM GRANULOCYTES NFR BLD AUTO: 0.3 % (ref 0–0.9)
IMM RETICS NFR: 5.3 % (ref 9.3–17.4)
LYMPHOCYTES # BLD AUTO: 2.67 K/UL (ref 1–4.8)
LYMPHOCYTES NFR BLD: 44.2 % (ref 22–41)
MCH RBC QN AUTO: 32.7 PG (ref 27–33)
MCHC RBC AUTO-ENTMCNC: 33.8 G/DL (ref 33.6–35)
MCV RBC AUTO: 96.8 FL (ref 81.4–97.8)
MONOCYTES # BLD AUTO: 0.52 K/UL (ref 0–0.85)
MONOCYTES NFR BLD AUTO: 8.6 % (ref 0–13.4)
NEUTROPHILS # BLD AUTO: 2.54 K/UL (ref 2–7.15)
NEUTROPHILS NFR BLD: 42.1 % (ref 44–72)
NRBC # BLD AUTO: 0 K/UL
NRBC BLD AUTO-RTO: 0 /100 WBC
PLATELET # BLD AUTO: 238 K/UL (ref 164–446)
PMV BLD AUTO: 9 FL (ref 9–12.9)
RBC # BLD AUTO: 4.43 M/UL (ref 4.2–5.4)
RETICS # AUTO: 0.04 M/UL (ref 0.04–0.06)
RETICS/RBC NFR: 0.9 % (ref 0.8–2.1)
WBC # BLD AUTO: 6 K/UL (ref 4.8–10.8)

## 2017-03-14 PROCEDURE — 160027 HCHG SURGERY MINUTES - 1ST 30 MINS LEVEL 2: Performed by: HOSPITALIST

## 2017-03-14 PROCEDURE — 38221 DX BONE MARROW BIOPSIES: CPT | Performed by: HOSPITALIST

## 2017-03-14 PROCEDURE — 88342 IMHCHEM/IMCYTCHM 1ST ANTB: CPT

## 2017-03-14 PROCEDURE — 85025 COMPLETE CBC W/AUTO DIFF WBC: CPT

## 2017-03-14 PROCEDURE — 88305 TISSUE EXAM BY PATHOLOGIST: CPT

## 2017-03-14 PROCEDURE — 700111 HCHG RX REV CODE 636 W/ 250 OVERRIDE (IP)

## 2017-03-14 PROCEDURE — 160002 HCHG RECOVERY MINUTES (STAT): Performed by: HOSPITALIST

## 2017-03-14 PROCEDURE — 99152 MOD SED SAME PHYS/QHP 5/>YRS: CPT | Performed by: HOSPITALIST

## 2017-03-14 PROCEDURE — 700111 HCHG RX REV CODE 636 W/ 250 OVERRIDE (IP): Performed by: HOSPITALIST

## 2017-03-14 PROCEDURE — 85046 RETICYTE/HGB CONCENTRATE: CPT

## 2017-03-14 PROCEDURE — 88341 IMHCHEM/IMCYTCHM EA ADD ANTB: CPT

## 2017-03-14 PROCEDURE — 160038 HCHG SURGERY MINUTES - EA ADDL 1 MIN LEVEL 2: Performed by: HOSPITALIST

## 2017-03-14 PROCEDURE — 99153 MOD SED SAME PHYS/QHP EA: CPT | Performed by: HOSPITALIST

## 2017-03-14 PROCEDURE — 160048 HCHG OR STATISTICAL LEVEL 1-5: Performed by: HOSPITALIST

## 2017-03-14 PROCEDURE — 88313 SPECIAL STAINS GROUP 2: CPT

## 2017-03-14 PROCEDURE — 84703 CHORIONIC GONADOTROPIN ASSAY: CPT

## 2017-03-14 PROCEDURE — 88311 DECALCIFY TISSUE: CPT

## 2017-03-14 RX ORDER — MIDAZOLAM HYDROCHLORIDE 1 MG/ML
INJECTION INTRAMUSCULAR; INTRAVENOUS
Status: DISCONTINUED | OUTPATIENT
Start: 2017-03-14 | End: 2017-03-14 | Stop reason: HOSPADM

## 2017-03-14 RX ORDER — MIDAZOLAM HYDROCHLORIDE 1 MG/ML
.5-2 INJECTION INTRAMUSCULAR; INTRAVENOUS PRN
Status: DISCONTINUED | OUTPATIENT
Start: 2017-03-14 | End: 2017-03-14 | Stop reason: HOSPADM

## 2017-03-14 RX ORDER — SODIUM CHLORIDE 9 MG/ML
500 INJECTION, SOLUTION INTRAVENOUS PRN
Status: DISCONTINUED | OUTPATIENT
Start: 2017-03-14 | End: 2017-03-14 | Stop reason: HOSPADM

## 2017-03-14 ASSESSMENT — PAIN SCALES - GENERAL
PAINLEVEL_OUTOF10: 7
PAINLEVEL_OUTOF10: 5

## 2017-03-14 NOTE — IP AVS SNAPSHOT
3/14/2017          Vivian Kaur  [address]  [place] [address]    Dear Vivian Meredith:    [facility] wants to ensure your discharge home is safe and you or your loved ones have had all your questions answered regarding your care after you leave the hospital.    You may receive a telephone call within two days of your discharge.  This call is to make certain you understand your discharge instructions as well as ensure we provided you with the best care possible during your stay with us.     The call will only last approximately 3-5 minutes and will be done by a nurse.    Once again, we want to ensure your discharge home is safe and that you have a clear understanding of any next steps in your care.  If you have any questions or concerns, please do not hesitate to contact us, we are here for you.  Thank you for choosing [facility] for your healthcare needs.    Sincerely,    Larry Arriaga    [facility]

## 2017-03-14 NOTE — IP AVS SNAPSHOT
" Home Care Instructions                                                                                                                Name:Vivian Kaur  Medical Record Number:[id number]  CSN: [id number]    YOB: 1980   Age: 36 y.o.  Sex: female  HT:1.778 m (5' 10\") WT: 104.4 kg (230 lb 2.6 oz)          Admit Date: 3/14/2017     Discharge Date:   Today's Date: 3/14/2017  Attending Doctor:  Zhen Theodore M.D.                  Allergies:  Ativan; Haldol; Pcn; and Pcn                Discharge Instructions       BONE MARROW ASPIRATION & BIOPSY DISCHARGE INSTRUCTIONS    1. After the numbing medicine wears off, you may feel some discomfort.    2. Keep bandage clean and dry for 24 hours.  After this time, you can change the bandage.  You may now bathe or shower.    3. If bleeding occurs after your bone marrow aspiration or biopsy, apply pressure to the area and call your doctor immediately.    4. Call your doctor if pain persists for greater than 24 hours in the area where you had your aspiration or biopsy.    5. Call your doctor immediately if you notice redness or drainage in the area or if you have a fever.    6. Call your doctor if you have numbness or weakness in the area where the doctor took the bone marrow or down your leg.    7. Do not drive or drink alcohol for 24 hours if you have had sedation medication.  The medication will make you drowsy.    8. Resume your regular diet.    9. Follow up with your doctor.    10. Additional instructions: **NONE*    11. Prescriptions: *NONE**        I acknowledge receipt and understanding of these Home Care Instructions.           Medication List      ASK your doctor about these medications        Instructions    alprazolam 2 MG tablet   Commonly known as:  XANAX    Take 2 mg by mouth at bedtime as needed for Sleep.   Dose:  2 mg       NS Soln        oxycodone-acetaminophen  MG Tabs   Commonly known as:  PERCOCET-10    Take 1-2 Tabs by mouth every four " hours as needed for Severe Pain.   Dose:  1-2 Tab       prochlorperazine 10 MG Tabs   Commonly known as:  COMPAZINE        VOLTAREN 1 % Gel   Generic drug:  Diclofenac Sodium                Medication Information     Above and/or attached are the medications your physician expects you to take upon discharge. Review all of your home medications and newly ordered medications with your doctor and/or pharmacist. Follow medication instructions as directed by your doctor and/or pharmacist. Please keep your medication list with you and share with your physician. Update the information when medications are discontinued, doses are changed, or new medications (including over-the-counter products) are added; and carry medication information at all times in the event of emergency situations.        Resources     Quit Smoking / Tobacco Use:    I understand the use of any tobacco products increases my chance of suffering from future heart disease or stroke and could cause other illnesses which may shorten my life. Quitting the use of tobacco products is the single most important thing I can do to improve my health. For further information on smoking / tobacco cessation call a Toll Free Quit Line at 1-330.980.9427 (*[employer] [place]) or 1-783.449.6629 (American [employer] Association) or you can access the web based program at www.lungusa.org.    [place] Tobacco Users Help Line:  (193) 165-1926       Toll Free: 1-125.933.3551  Quit Tobacco Program [facility] (627)901-3297    Crisis Hotline:    [place] Crisis Hotline:  1-261-SNZXZWK or 1-616.816.5246    [place] Crisis Hotline:    1-992.556.1246 or 697-024-6256    Discharge Survey:   Thank you for choosing [facility]. We hope we did everything we could to make your hospital stay a pleasant one. You may be receiving a survey and we would appreciate your time and participation in answering the questions. Your input is very valuable to us in our efforts to  improve our service to our patients and their families.            Signatures     My signature on this form indicates that:    1. I acknowledge receipt and understanding of these Home Care Instruction.  2. My questions regarding this information have been answered to my satisfaction.  3. I have formulated a plan with my discharge nurse to obtain my prescribed medications for home.    __________________________________      __________________________________                   Patient Signature                                 Guardian/Responsible Adult Signature      __________________________________                 __________       ________                       Nurse Signature                                               Date                 Time

## 2017-03-14 NOTE — PROCEDURES
DATE OF PROCEDURE: 3/14/2017    PROCEDURE: Bone marrow biopsy with bone marrow aspiration.     REQUESTING PHYSICIAN: Dr. Hall     INDICATIONS: Avascular Necrosis    INFORMED CONSENT: Consent signed and on the chart.     DESCRIPTION: A time out was called. The patient was placed in the prone position. The left buttock was prepped and draped in a sterile fashion.  1 mL of 1% lidocaine was injected into the skin followed by 3 mL into the periosteum of the posterior ilium bone. Large-bore needle was used to obtain 5 mL of aspirate followed by 5 mL   into a heparinized syringe for flow cytometry. This followed by a  bone marrow biopsy using the Jamshidi needle.     SEDATION USED: 3mg IV Versed & 75mcg IV Fentanyl    ESTIMATED BLOOD LOSS: none

## 2017-03-14 NOTE — OR NURSING
1245 Received from Raquel.  No c/o of n/v.  States tolerable pain    1259 Dc instructions completed with Pt and her .      1300 Dc to car via ambulating, pt has all belongings.

## 2017-03-14 NOTE — DISCHARGE INSTRUCTIONS
BONE MARROW ASPIRATION & BIOPSY DISCHARGE INSTRUCTIONS    1. After the numbing medicine wears off, you may feel some discomfort.    2. Keep bandage clean and dry for 24 hours.  After this time, you can change the bandage.  You may now bathe or shower.    3. If bleeding occurs after your bone marrow aspiration or biopsy, apply pressure to the area and call your doctor immediately.    4. Call your doctor if pain persists for greater than 24 hours in the area where you had your aspiration or biopsy.    5. Call your doctor immediately if you notice redness or drainage in the area or if you have a fever.    6. Call your doctor if you have numbness or weakness in the area where the doctor took the bone marrow or down your leg.    7. Do not drive or drink alcohol for 24 hours if you have had sedation medication.  The medication will make you drowsy.    8. Resume your regular diet.    9. Follow up with your doctor.    10. Additional instructions: **NONE*    11. Prescriptions: *NONE**        I acknowledge receipt and understanding of these Home Care Instructions.

## 2017-03-21 ENCOUNTER — TELEPHONE (OUTPATIENT)
Dept: HEMATOLOGY ONCOLOGY | Facility: MEDICAL CENTER | Age: 37
End: 2017-03-21

## 2017-03-23 DIAGNOSIS — M87.00 AVASCULAR NECROSIS OF BONE (HCC): ICD-10-CM

## 2017-04-04 ENCOUNTER — TELEPHONE (OUTPATIENT)
Dept: HEMATOLOGY ONCOLOGY | Facility: MEDICAL CENTER | Age: 37
End: 2017-04-04

## 2017-04-04 NOTE — TELEPHONE ENCOUNTER
Patient called this morning wanting to know where her referral to [facility] is at? She hasnt received a phone a call. She did however receive a phone from [facility] our referral team. But still no phone from [facility]. She has no number to give [place] a call. I did speak with mallory and she will see where her referral is at and I will give patient a phone call back.

## 2017-04-13 ENCOUNTER — OFFICE VISIT (OUTPATIENT)
Dept: INTERNAL MEDICINE | Facility: MEDICAL CENTER | Age: 37
End: 2017-04-13
Payer: MEDICAID

## 2017-04-13 VITALS
TEMPERATURE: 98.4 F | HEIGHT: 70 IN | DIASTOLIC BLOOD PRESSURE: 88 MMHG | OXYGEN SATURATION: 90 % | HEART RATE: 91 BPM | WEIGHT: 237.2 LBS | SYSTOLIC BLOOD PRESSURE: 136 MMHG | RESPIRATION RATE: 20 BRPM | BODY MASS INDEX: 33.96 KG/M2

## 2017-04-13 DIAGNOSIS — M25.552 PAIN OF LEFT HIP JOINT: ICD-10-CM

## 2017-04-13 DIAGNOSIS — M87.00 AVASCULAR NECROSIS OF BONE (HCC): ICD-10-CM

## 2017-04-13 PROCEDURE — 99214 OFFICE O/P EST MOD 30 MIN: CPT | Mod: GC | Performed by: INTERNAL MEDICINE

## 2017-04-13 RX ORDER — FOLIC ACID 1 MG/1
1 TABLET ORAL DAILY
Qty: 30 TAB | Refills: 6 | Status: SHIPPED | OUTPATIENT
Start: 2017-04-13 | End: 2017-07-17 | Stop reason: SDUPTHER

## 2017-04-13 RX ORDER — FENTANYL 75 UG/1
1 PATCH TRANSDERMAL
Qty: 5 PATCH | Refills: 0
Start: 2017-04-13 | End: 2017-04-13

## 2017-04-13 RX ORDER — ASPIRIN 81 MG/1
81 TABLET, CHEWABLE ORAL ONCE
Status: DISCONTINUED | OUTPATIENT
Start: 2017-04-13 | End: 2017-04-13

## 2017-04-13 RX ORDER — FENTANYL 75 UG/1
1 PATCH TRANSDERMAL
Qty: 5 PATCH | Refills: 0 | Status: SHIPPED | DISCHARGE
Start: 2017-04-13 | End: 2017-08-21

## 2017-04-13 RX ORDER — ALENDRONATE SODIUM 70 MG/1
70 TABLET ORAL
Qty: 4 TAB | Refills: 3 | Status: SHIPPED | OUTPATIENT
Start: 2017-04-13 | End: 2017-07-17 | Stop reason: SDUPTHER

## 2017-04-13 ASSESSMENT — PATIENT HEALTH QUESTIONNAIRE - PHQ9: CLINICAL INTERPRETATION OF PHQ2 SCORE: 2

## 2017-04-13 ASSESSMENT — PAIN SCALES - GENERAL: PAINLEVEL: 7=MODERATE-SEVERE PAIN

## 2017-04-13 NOTE — MR AVS SNAPSHOT
"Vivian Kaur   2017 1:45 PM   Office Visit   MRN: [id number]    Department:  [facility]   Dept Phone:  [phone]    Description:  Female : 1980   Provider:  Izzy Ann M.D.           Reason for Visit     Referral Needed osteo necrosis    Referral Needed endo      Allergies as of 2017     Allergen Noted Reactions    Ativan 2017       Black out     Haldol [Haloperidol] 2017       Black out     Pcn [Penicillins] 2009       Pcn [Penicillins] 2009         You were diagnosed with     Avascular necrosis of bone (CMS-HCC)   [088180]       Pain of left hip joint   [2448232]         Vital Signs     Blood Pressure Pulse Temperature Respirations Height Weight    136/88 mmHg 91 36.9 °C (98.4 °F) 20 1.778 m (5' 10\") 107.593 kg (237 lb 3.2 oz)    Body Mass Index Oxygen Saturation Breastfeeding? Smoking Status          34.03 kg/m2 90% No Current Every Day Smoker        Basic Information     Date Of Birth Sex Race Ethnicity Preferred Language    1980 Female White Unknown English      Problem List              ICD-10-CM Priority Class Noted - Resolved    Avascular necrosis of bone (CMS-HCC) M87.00   2017 - Present      Health Maintenance        Date Due Completion Dates    IMM DTaP/Tdap/Td Vaccine (6 - Tdap) 3/27/1997 3/26/1997, 3/17/1987, 1984, 1983, 1/3/1983, 1980    PAP SMEAR 2003 ---            Current Immunizations     Dtap Vaccine 3/17/1987, 1984, 1983, 1/3/1983, 1980    HIB Vaccine(PEDVAX) 1986    MMR Vaccine 3/26/1997, 1983    OPV - Historical Data 3/17/1987, 1983, 1/3/1983, 1980    TD Vaccine 3/26/1997      Below and/or attached are the medications your provider expects you to take. Review all of your home medications and newly ordered medications with your provider and/or pharmacist. Follow medication instructions as directed by your provider and/or pharmacist. Please keep your medication " list with you and share with your provider. Update the information when medications are discontinued, doses are changed, or new medications (including over-the-counter products) are added; and carry medication information at all times in the event of emergency situations     Allergies:  ATIVAN - (reactions not documented)     HALDOL - (reactions not documented)     PCN - (reactions not documented)     PCN - (reactions not documented)               Medications  Valid as of: May 18, 2017 -  2:20 PM    Generic Name Brand Name Tablet Size Instructions for use    Alendronate Sodium (Tab) FOSAMAX 70 MG Take 1 Tab by mouth every 7 days.        ALPRAZolam (Tab) XANAX 2 MG Take 2 mg by mouth at bedtime as needed for Sleep.        Aspirin (Tablet Delayed Response) ECOTRIN 81 MG Take 1 Tab by mouth every day.        FentaNYL (PATCH 72 HR) DURAGESIC 75 MCG/HR Apply 1 Patch to skin as directed every 72 hours.        Folic Acid (Tab) FOLVITE 1 MG Take 1 Tab by mouth every day.        Oxycodone-Acetaminophen (Tab) PERCOCET-10  MG Take 1-2 Tabs by mouth every four hours as needed for Severe Pain.        Prochlorperazine Maleate (Tab) COMPAZINE 10 MG         .                 Medicines prescribed today were sent to:     [facility] [address] [place] [id number]    Phone: [phone] Fax: [phone]    Open 24 Hours?: No      Medication refill instructions:       If your prescription bottle indicates you have medication refills left, it is not necessary to call your provider’s office. Please contact your pharmacy and they will refill your medication.    If your prescription bottle indicates you do not have any refills left, you may request refills at any time through one of the following ways: The online betNOW system (except Urgent Care), by calling your provider’s office, or by asking your pharmacy to contact your provider’s office with a refill request. Medication  refills are processed only during regular business hours and may not be available until the next business day. Your provider may request additional information or to have a follow-up visit with you prior to refilling your medication.   *Please Note: Medication refills are assigned a new Rx number when refilled electronically. Your pharmacy may indicate that no refills were authorized even though a new prescription for the same medication is available at the pharmacy. Please request the medicine by name with the pharmacy before contacting your provider for a refill.        Your To Do List     Future Labs/Procedures Complete By Expires    DX-HIP-BILATERAL-WITH PELVIS-2 VIEWS  As directed 4/13/2018    HOMOCYSTEINE  As directed 4/13/2018      Referral     A referral request has been sent to our patient care coordination department. Please allow 3-5 business days for us to process this request and contact you either by phone or mail. If you do not hear from us by the 5th business day, please call us at [phone].        Instructions    Avascular Necrosis  Avascular necrosis is a disease resulting from the temporary or permanent loss of blood supply to a bone. This disease may also be known as:   · Osteonecrosis.    · Aseptic necrosis.    · Ischemic bone necrosis.  Without proper blood supply, the internal layer of the affected bone dies and the outer layer of the bone may break down. If this process affects a bone near a joint, it may lead to collapse of that joint. Common bones that are affected by this condition include:  ·  The top of your thigh bone (femoral head).  ·  One or more bones in your wrist (scaphoid or lunate).  ·  One or more bones in your foot (metatarsals).  ·  One of the bones in your ankle (navicular).  The joint most commonly affected by this condition is the hip joint.  Avascular necrosis rarely occurs in more than one bone at a time.   CAUSES   · Damage or injury to a bone or joint.  · Using  corticosteroid medicine for a long period of time.  · Changes in your immune or hormone systems.  · Excessive exposure to radiation.  RISK FACTORS  · Alcohol abuse.  · Previous traumatic injury to a joint.  · Using corticosteroid medicines for a long period of time or often.  · Having a medical condition such as:  ¨ HIV or AIDS.    ¨ Diabetes.    ¨ Sickle cell disease.  ¨ An autoimmune disease.  SIGNS AND SYMPTOMS   The main symptoms of avascular necrosis are pain and decreased motion in the affected bone or joint. In the early stages the pain may be minor and occur only with activity. As avascular necrosis progresses, pain may gradually worsen and occur while at rest. The pain may suddenly become severe if an affected joint collapses.  DIAGNOSIS   Avascular necrosis may be diagnosed with:   · A medical history.  · A physical exam.    · X-rays.  · An MRI.  · A bone scan.  TREATMENT   Treatments may include:  · Medicine to help relieve pain.  · Avoiding placing any pressure or weight on the affected area. If avascular necrosis occurs in your hip, ankle, or foot, you may be instructed to use crutches or a rolling scooter.  · Surgery, such as:    ¨ Core decompression. In this surgery, one or more holes are placed in the bone for new blood vessels to grow into. This provides a renewed blood supply to the bone. Core decompression can often reduce pain and pressure in the affected bone and slow the progression of bone and joint destruction.  ¨ Osteotomy. In this surgery, the bone is reshaped to reduce stress on the affected area of the joint.    ¨ Bone grafting. In this surgery, healthy bone from one part of your body is transplanted to the affected area.    ¨ Arthroplasty. Arthroplasty is also known as total joint replacement. In this surgery, the affected surface on one or both sides of a joint is replaced with artificial parts (prostheses).  · Electrical stimulation. This may help encourage new bone growth.  HOME CARE  INSTRUCTIONS  · Take medicines only as directed by your health care provider.    · Follow your health care provider's recommendations on limiting activities or using crutches to rest your affected joint.    · Meet with a physical therapist as directed by your health care provider.    · Keep all follow-up visits as directed by your health care provider. This is important.  SEEK MEDICAL CARE IF:   · Your pain worsens.  · You have decreased motion in your affected joint.  SEEK IMMEDIATE MEDICAL CARE IF:   Your pain suddenly becomes severe.     This information is not intended to replace advice given to you by your health care provider. Make sure you discuss any questions you have with your health care provider.     Document Released: 06/09/2003 Document Revised: 01/08/2016 Document Reviewed: 02/25/2015  [employer] Interactive Patient Education ©2016 [employer]            BuildFax Access Code: Activation code not generated  Current BuildFax Status: Active          Quit Tobacco Information     Do you want to quit using tobacco?    Quitting tobacco decreases risks of cancer, heart and lung disease, increases life expectancy, improves sense of taste and smell, and increases spending money, among other benefits.    If you are thinking about quitting, we can help.  • [employer] Quit Tobacco Program: 325.545.5204  o Program occurs weekly for four weeks and includes pharmacist consultation on products to support quitting smoking or chewing tobacco. A provider referral is needed for pharmacist consultation.  • Tobacco Users Help Hotline: 0-885-QUITNOW (194-7961) or https://[place].quitlogix.org/  o Free, confidential telephone and online coaching for [place] residents. Sessions are designed on a schedule that is convenient for you. Eligible clients receive free nicotine replacement therapy.  • Nationally: www.smokefree.gov  o Information and professional assistance to support both immediate and long-term needs as you become, and  remain, a non-smoker. Smokefree.gov allows you to choose the help that best fits your needs.

## 2017-04-13 NOTE — PROGRESS NOTES
"      Established Patient    Vivian Meredith presents today with the following:    CC: Orthopedic referral    HPI:     36-year-old female here for follow-up of avascular necrosis of unknown cause. Please refer to office visit note from February 2017 for further details. Patient has a long-standing history of avascular necrosis and has seen multiple specialists and has had extensive workup done in the past which is failed to elicit any cause. She saw Dr. Hall on 3/8/2017 who ordered BM biopsy which came back normal. Also suggested folic acid, aspirin, prostaglandin analogue and bisphosphonates. She was also referred to [facility] who rejected her because of her insurance.    Today patient is complaining of R hip pain at the site of bone marrow biopsy. She states that the pain never went away and has been worsening. She has difficult ambulating and has on and off hot flashes and subjective fevers but this is chronic for her.     Her PCP has increased her pain medication regimen and has added fentanyl 75 µg every 3 days.    Patient Active Problem List    Diagnosis Date Noted   • Avascular necrosis of bone (CMS-[facility]) 02/01/2017       Current Outpatient Prescriptions   Medication Sig Dispense Refill   • VOLTAREN 1 % Gel      • Sodium Chloride (NS) Solution      • prochlorperazine (COMPAZINE) 10 MG Tab      • oxycodone-acetaminophen (PERCOCET-10)  MG Tab Take 1-2 Tabs by mouth every four hours as needed for Severe Pain.     • alprazolam (XANAX) 2 MG tablet Take 2 mg by mouth at bedtime as needed for Sleep.       No current facility-administered medications for this visit.       ROS: As per HPI.     /88 mmHg  Pulse 91  Temp(Src) 36.9 °C (98.4 °F)  Resp 20  Ht 1.778 m (5' 10\")  Wt 107.593 kg (237 lb 3.2 oz)  BMI 34.03 kg/m2  SpO2 90%  Breastfeeding? No    Physical Exam   Constitutional:  oriented to person, place, and time. Tearful  Eyes: Pupils are equal, round, and reactive to light. No scleral " icterus.  Neck: Neck supple. No thyromegaly present.   Cardiovascular: Normal rate, regular rhythm and normal heart sounds.  Exam reveals no gallop and no friction rub.  No murmur heard.  Pulmonary/Chest: Breath sounds normal. Chest wall is not dull to percussion.   Musculoskeletal:   Tenderness at site of bone marrow biopsy. Normal range of motion.  Lymphadenopathy: no cervical adenopathy  Neurological: alert and oriented to person, place, and time.   Skin: No cyanosis. Nails show no clubbing.        Assessment and Plan    1. Multifocal avascular necrosis of bone, possibly secondary to hyper homocysteinemia  Severity-high  Patient unable to work secondary to extreme pain  Also unable to function appropriately secondary to high dose narcotics prescribed by her PCP  Patient tearful today because we have not been able determine the cause of AVN  She is requesting referral to [facility] since [facility] is not accepting her insurance-explained that very high chance that they will also not accept her insurance  Provided referral to endocrinologist per patient request (patient tearful after being told that endocrinologist might not be able to help her out)  Will prescribe her ASA, folic acid and alendronate as suggested by Dr. Hall-wolftate recommendations.  Referral to new orthopedic surgeon provided since Dr. Madrid has left practice  Will check homocysteine levels after she starts taking folic acid.  - REFERRAL TO ENDOCRINOLOGY  - REFERRAL TO ORTHOPEDICS  - aspirin (ASA) chewable tab 81 mg; Take 1 Tab every day  - alendronate (FOSAMAX) 70 MG Tab; Take 1 Tab by mouth every 7 days.  Dispense: 4 Tab; Refill: 3  - folic acid (FOLVITE) 1 MG Tab; Take 1 Tab by mouth every day.  Dispense: 30 Tab; Refill: 6  - HOMOCYSTEINE; Future    2. Hip pain after BM biopsy.  Will get hip x-ray  - DX-HIP-BILATERAL-WITH PELVIS-2 VIEWS; Future    3. Rest of the management per PCP in [place].      Followup: 5  WEEKS    Signed by: Izzy Ann M.D.

## 2017-04-13 NOTE — PATIENT INSTRUCTIONS
Avascular Necrosis  Avascular necrosis is a disease resulting from the temporary or permanent loss of blood supply to a bone. This disease may also be known as:   · Osteonecrosis.    · Aseptic necrosis.    · Ischemic bone necrosis.  Without proper blood supply, the internal layer of the affected bone dies and the outer layer of the bone may break down. If this process affects a bone near a joint, it may lead to collapse of that joint. Common bones that are affected by this condition include:  ·  The top of your thigh bone (femoral head).  ·  One or more bones in your wrist (scaphoid or lunate).  ·  One or more bones in your foot (metatarsals).  ·  One of the bones in your ankle (navicular).  The joint most commonly affected by this condition is the hip joint.  Avascular necrosis rarely occurs in more than one bone at a time.   CAUSES   · Damage or injury to a bone or joint.  · Using corticosteroid medicine for a long period of time.  · Changes in your immune or hormone systems.  · Excessive exposure to radiation.  RISK FACTORS  · Alcohol abuse.  · Previous traumatic injury to a joint.  · Using corticosteroid medicines for a long period of time or often.  · Having a medical condition such as:  ¨ HIV or AIDS.    ¨ Diabetes.    ¨ Sickle cell disease.  ¨ An autoimmune disease.  SIGNS AND SYMPTOMS   The main symptoms of avascular necrosis are pain and decreased motion in the affected bone or joint. In the early stages the pain may be minor and occur only with activity. As avascular necrosis progresses, pain may gradually worsen and occur while at rest. The pain may suddenly become severe if an affected joint collapses.  DIAGNOSIS   Avascular necrosis may be diagnosed with:   · A medical history.  · A physical exam.    · X-rays.  · An MRI.  · A bone scan.  TREATMENT   Treatments may include:  · Medicine to help relieve pain.  · Avoiding placing any pressure or weight on the affected area. If avascular necrosis occurs in  your hip, ankle, or foot, you may be instructed to use crutches or a rolling scooter.  · Surgery, such as:    ¨ Core decompression. In this surgery, one or more holes are placed in the bone for new blood vessels to grow into. This provides a renewed blood supply to the bone. Core decompression can often reduce pain and pressure in the affected bone and slow the progression of bone and joint destruction.  ¨ Osteotomy. In this surgery, the bone is reshaped to reduce stress on the affected area of the joint.    ¨ Bone grafting. In this surgery, healthy bone from one part of your body is transplanted to the affected area.    ¨ Arthroplasty. Arthroplasty is also known as total joint replacement. In this surgery, the affected surface on one or both sides of a joint is replaced with artificial parts (prostheses).  · Electrical stimulation. This may help encourage new bone growth.  HOME CARE INSTRUCTIONS  · Take medicines only as directed by your health care provider.    · Follow your health care provider's recommendations on limiting activities or using crutches to rest your affected joint.    · Meet with a physical therapist as directed by your health care provider.    · Keep all follow-up visits as directed by your health care provider. This is important.  SEEK MEDICAL CARE IF:   · Your pain worsens.  · You have decreased motion in your affected joint.  SEEK IMMEDIATE MEDICAL CARE IF:   Your pain suddenly becomes severe.     This information is not intended to replace advice given to you by your health care provider. Make sure you discuss any questions you have with your health care provider.     Document Released: 06/09/2003 Document Revised: 01/08/2016 Document Reviewed: 02/25/2015  [employer] Interactive Patient Education ©2016 [employer]

## 2017-04-24 ENCOUNTER — TELEPHONE (OUTPATIENT)
Dept: HEMATOLOGY ONCOLOGY | Facility: MEDICAL CENTER | Age: 37
End: 2017-04-24

## 2017-04-24 NOTE — TELEPHONE ENCOUNTER
"Patient informs me she was informed by [facility] they do not take her insurance (Medicaid FFS) so her referral was denied to see an orthopedist.  She states Dr. Mccoy (at [facility]) is referring her to an orthopedic physician and an endocrinologist, she cannot recall the names. I informed her I will relay the message to Dr. Hall. Patient agreed.      She also informs me she spoke to Dr. Hall in regards to her bone marrow biopsy results which \"were normal\".     "

## 2017-05-18 ENCOUNTER — OFFICE VISIT (OUTPATIENT)
Dept: INTERNAL MEDICINE | Facility: MEDICAL CENTER | Age: 37
End: 2017-05-18
Payer: MEDICAID

## 2017-05-18 VITALS
OXYGEN SATURATION: 96 % | SYSTOLIC BLOOD PRESSURE: 132 MMHG | TEMPERATURE: 97.4 F | RESPIRATION RATE: 18 BRPM | WEIGHT: 237.8 LBS | BODY MASS INDEX: 36.04 KG/M2 | DIASTOLIC BLOOD PRESSURE: 86 MMHG | HEART RATE: 78 BPM | HEIGHT: 68 IN

## 2017-05-18 DIAGNOSIS — M87.00 AVASCULAR NECROSIS OF BONE (HCC): ICD-10-CM

## 2017-05-18 DIAGNOSIS — M25.551 PAIN OF RIGHT HIP JOINT: ICD-10-CM

## 2017-05-18 PROCEDURE — 99213 OFFICE O/P EST LOW 20 MIN: CPT | Mod: GE | Performed by: INTERNAL MEDICINE

## 2017-05-18 ASSESSMENT — PAIN SCALES - GENERAL: PAINLEVEL: 6=MODERATE PAIN

## 2017-05-18 NOTE — MR AVS SNAPSHOT
"Vivian Kaur   2017 2:00 PM   Office Visit   MRN: [id number]    Department:  [facility] - Internal Med   Dept Phone:  [phone]    Description:  Female : 1980   Provider:  Izzy Ann M.D.           Reason for Visit     Hip Pain follow up    Depression review x rays      Allergies as of 2017     Allergen Noted Reactions    Ativan 2017       Black out     Haldol [Haloperidol] 2017       Black out     Pcn [Penicillins] 2009       Pcn [Penicillins] 2009         You were diagnosed with     Avascular necrosis of bone (CMS-HCC)   [223645]       Pain of right hip joint   [1914384]         Vital Signs     Blood Pressure Pulse Temperature Respirations Height Weight    132/86 mmHg 78 36.3 °C (97.4 °F) 18 1.727 m (5' 8\") 107.865 kg (237 lb 12.8 oz)    Body Mass Index Oxygen Saturation Breastfeeding? Smoking Status          36.17 kg/m2 96% No Current Every Day Smoker        Basic Information     Date Of Birth Sex Race Ethnicity Preferred Language    1980 Female White Unknown English      Your appointments     2017  1:45 PM   Established Patient with Izzy Ann M.D.   [facility] - Internal Medicine (--)    [address] [place] [address] [id number] [phone]           You will be receiving a confirmation call a few days before your appointment from our automated call confirmation system.              Problem List              ICD-10-CM Priority Class Noted - Resolved    Avascular necrosis of bone (CMS-HCC) M87.00   2017 - Present      Health Maintenance        Date Due Completion Dates    IMM DTaP/Tdap/Td Vaccine (6 - Tdap) 3/27/1997 3/26/1997, 3/17/1987, 1984, 1983, 1/3/1983, 1982    PAP SMEAR 2003 ---            Current Immunizations     Dtap Vaccine 3/17/1987, 1984, 1983, 1/3/1983, 1982    HIB Vaccine(PEDVAX) 1986    MMR Vaccine 3/26/1997, 1983    OPV - Historical Data " 3/17/1987, 4/6/1983, 1/3/1983, 1982    TD Vaccine 3/26/1997      Below and/or attached are the medications your provider expects you to take. Review all of your home medications and newly ordered medications with your provider and/or pharmacist. Follow medication instructions as directed by your provider and/or pharmacist. Please keep your medication list with you and share with your provider. Update the information when medications are discontinued, doses are changed, or new medications (including over-the-counter products) are added; and carry medication information at all times in the event of emergency situations     Allergies:  ATIVAN - (reactions not documented)     HALDOL - (reactions not documented)     PCN - (reactions not documented)     PCN - (reactions not documented)               Medications  Valid as of: May 18, 2017 -  2:22 PM    Generic Name Brand Name Tablet Size Instructions for use    Alendronate Sodium (Tab) FOSAMAX 70 MG Take 1 Tab by mouth every 7 days.        ALPRAZolam (Tab) XANAX 2 MG Take 2 mg by mouth at bedtime as needed for Sleep.        Aspirin (Tablet Delayed Response) ECOTRIN 81 MG Take 1 Tab by mouth every day.        FentaNYL (PATCH 72 HR) DURAGESIC 75 MCG/HR Apply 1 Patch to skin as directed every 72 hours.        Folic Acid (Tab) FOLVITE 1 MG Take 1 Tab by mouth every day.        Oxycodone-Acetaminophen (Tab) PERCOCET-10  MG Take 1-2 Tabs by mouth every four hours as needed for Severe Pain.        Prochlorperazine Maleate (Tab) COMPAZINE 10 MG         .                 Medicines prescribed today were sent to:     [address] [address]    Phone: [phone] Fax: [phone]    Open 24 Hours?: No      Medication refill instructions:       If your prescription bottle indicates you have medication refills left, it is not necessary to call your provider’s office. Please contact your pharmacy and they  will refill your medication.    If your prescription bottle indicates you do not have any refills left, you may request refills at any time through one of the following ways: The online [employer] system (except Urgent Care), by calling your provider’s office, or by asking your pharmacy to contact your provider’s office with a refill request. Medication refills are processed only during regular business hours and may not be available until the next business day. Your provider may request additional information or to have a follow-up visit with you prior to refilling your medication.   *Please Note: Medication refills are assigned a new Rx number when refilled electronically. Your pharmacy may indicate that no refills were authorized even though a new prescription for the same medication is available at the pharmacy. Please request the medicine by name with the pharmacy before contacting your provider for a refill.        Your To Do List     Future Labs/Procedures Complete By Expires    HOMOCYSTEINE  As directed 5/18/2018      Referral     A referral request has been sent to our patient care coordination department. Please allow 3-5 business days for us to process this request and contact you either by phone or mail. If you do not hear from us by the 5th business day, please call us at [phone].           Stackify Access Code: Activation code not generated  Current [employer] Status: Active          Quit Tobacco Information     Do you want to quit using tobacco?    Quitting tobacco decreases risks of cancer, heart and lung disease, increases life expectancy, improves sense of taste and smell, and increases spending money, among other benefits.    If you are thinking about quitting, we can help.  • [facility] Quit Tobacco Program: 638.334.1093  o Program occurs weekly for four weeks and includes pharmacist consultation on products to support quitting smoking or chewing tobacco. A provider referral is needed for pharmacist  consultation.  • Tobacco Users Help Hotline: 800-QUIT-NOW (061-6984) or https://[place].quitlogix.org/  o Free, confidential telephone and online coaching for [place] residents. Sessions are designed on a schedule that is convenient for you. Eligible clients receive free nicotine replacement therapy.  • Nationally: www.smokefree.gov  o Information and professional assistance to support both immediate and long-term needs as you become, and remain, a non-smoker. Smokefree.gov allows you to choose the help that best fits your needs.

## 2017-05-18 NOTE — PROGRESS NOTES
Established Patient    Vivian Meredith presents today with the following:    CC: 5 week follow-up    HPI: 36-year-old female here for follow-up.    Patient is in better spirits today. However, she is frustrated that she has still not been able to see orthopedic physician. She got call from [facility] and orthopedic specialist who told her that her disease is beyond their specialty and there is no point in them seeing the patient. Patient is requesting referral to another orthopedic physician.    Her hip x-rays came back negative. She is no longer having hip pain.    She was put on hydrochlorothiazide by her PCP. Patient wants to know if she should take it or not. She states that her legs have been swollen recently but they're better now. She states that the last time she was given Lasix she became hypokalemic and could not tolerate it.    She did not do her homocystine levels. She states that she has been reading up about hyper homocystinemia and wants to take B complex vitamins and check her homocystine levels after she takes those vitamins.    She is also requesting DMV disability placard to be filled.    Patient Active Problem List    Diagnosis Date Noted   • Avascular necrosis of bone (CMS-HCC) 02/01/2017       Current Outpatient Prescriptions   Medication Sig Dispense Refill   • alendronate (FOSAMAX) 70 MG Tab Take 1 Tab by mouth every 7 days. 4 Tab 3   • folic acid (FOLVITE) 1 MG Tab Take 1 Tab by mouth every day. 30 Tab 6   • aspirin EC (ECOTRIN) 81 MG Tablet Delayed Response Take 1 Tab by mouth every day. 30 Tab 6   • fentanyl (DURAGESIC) 75 MCG/HR PATCH 72 HR Apply 1 Patch to skin as directed every 72 hours. 5 Patch 0   • prochlorperazine (COMPAZINE) 10 MG Tab      • oxycodone-acetaminophen (PERCOCET-10)  MG Tab Take 1-2 Tabs by mouth every four hours as needed for Severe Pain.     • alprazolam (XANAX) 2 MG tablet Take 2 mg by mouth at bedtime as needed for Sleep.       No current  "facility-administered medications for this visit.       ROS: As per HPI.     /86 mmHg  Pulse 78  Temp(Src) 36.3 °C (97.4 °F)  Resp 18  Ht 1.727 m (5' 8\")  Wt 107.865 kg (237 lb 12.8 oz)  BMI 36.17 kg/m2  SpO2 96%  Breastfeeding? No    Physical Exam   Constitutional:  oriented to person, place, and time. No distress.   Eyes: Pupils are equal, round, and reactive to light. No scleral icterus.  Neck: Neck supple. No thyromegaly present.   Cardiovascular: Normal rate, regular rhythm and normal heart sounds.  Exam reveals no gallop and no friction rub.  No murmur heard.  Pulmonary/Chest: Breath sounds normal. Chest wall is not dull to percussion.   Musculoskeletal:   No edema.   Lymphadenopathy: no cervical adenopathy  Neurological: alert and oriented to person, place, and time.   Skin: No cyanosis. Nails show no clubbing.      Assessment and Plan    1. 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  Requesting rereferral to orthopedics. Discussed that there might not be any orthopedic surgeon who would be willing to see her because of the complexity of her disease. Patient still wants to go ahead and try. Will put in a repeat referral.  She hasn't heard back from endocrinologist. Explained that it takes about 6-8 months to see an endocrinologist and she needs to wait.  She is taking ASA, folic acid and alendronate.   She did not get her homocystine. She wants to take B complex supplements and see if that leads to decreased homocysteine levels.  She is requesting for DMV disability placard. Filled the form and returned it to her.  Explained that she is not having water retention but possibly swelling " secondary to body wide inflammation. Discussed that it may not be beneficial for her to take hydrochlorothiazide.  - HOMOCYSTEINE; Future  - REFERRAL TO ORTHOPEDICS    2. Hip pain after BM biopsy.  Hip X-ray normal    3. Rest of the management per PCP in [place].      Followup: 5 weeks    Signed by: Izzy Ann M.D.

## 2017-06-02 ENCOUNTER — TELEPHONE (OUTPATIENT)
Dept: INTERNAL MEDICINE | Facility: MEDICAL CENTER | Age: 37
End: 2017-06-02

## 2017-06-02 DIAGNOSIS — M87.00 AVASCULAR NECROSIS OF BONE (HCC): ICD-10-CM

## 2017-06-02 NOTE — TELEPHONE ENCOUNTER
Vivian called in regards to her MRI order that she needs per [facility]. They need an MRI of her L hip before they can schedule her.  Pt was inquiring if we could send to [facility] in [place] for the MRI as she has limited transportation.  Pt also wants to know if we can do both hips and not just the left.  Order also has to be signed by resident and attending for [facility].

## 2017-06-19 ENCOUNTER — APPOINTMENT (OUTPATIENT)
Dept: INTERNAL MEDICINE | Facility: MEDICAL CENTER | Age: 37
End: 2017-06-19
Payer: MEDICAID

## 2017-07-17 ENCOUNTER — OFFICE VISIT (OUTPATIENT)
Dept: INTERNAL MEDICINE | Facility: MEDICAL CENTER | Age: 37
End: 2017-07-17
Payer: MEDICAID

## 2017-07-17 ENCOUNTER — TELEPHONE (OUTPATIENT)
Dept: INTERNAL MEDICINE | Facility: MEDICAL CENTER | Age: 37
End: 2017-07-17

## 2017-07-17 VITALS
DIASTOLIC BLOOD PRESSURE: 84 MMHG | OXYGEN SATURATION: 95 % | SYSTOLIC BLOOD PRESSURE: 124 MMHG | HEIGHT: 68 IN | WEIGHT: 226.8 LBS | HEART RATE: 107 BPM | TEMPERATURE: 98.9 F | BODY MASS INDEX: 34.37 KG/M2

## 2017-07-17 DIAGNOSIS — Z11.4 SCREENING FOR HIV WITHOUT PRESENCE OF RISK FACTORS: ICD-10-CM

## 2017-07-17 DIAGNOSIS — M87.00 AVASCULAR NECROSIS OF BONE (HCC): ICD-10-CM

## 2017-07-17 PROCEDURE — 99213 OFFICE O/P EST LOW 20 MIN: CPT | Mod: GE | Performed by: HOSPITALIST

## 2017-07-17 RX ORDER — FOLIC ACID 1 MG/1
1 TABLET ORAL DAILY
Qty: 90 TAB | Refills: 3 | Status: SHIPPED | OUTPATIENT
Start: 2017-07-17

## 2017-07-17 RX ORDER — ALENDRONATE SODIUM 70 MG/1
70 TABLET ORAL
Qty: 8 TAB | Refills: 3 | Status: SHIPPED | OUTPATIENT
Start: 2017-07-17 | End: 2018-05-30

## 2017-07-17 ASSESSMENT — ENCOUNTER SYMPTOMS
WEIGHT LOSS: 1
DIZZINESS: 0
BLURRED VISION: 0
ABDOMINAL PAIN: 0
SENSORY CHANGE: 0
FEVER: 0
MYALGIAS: 0
HEADACHES: 0
CONSTIPATION: 0
COUGH: 0
WEAKNESS: 0
FOCAL WEAKNESS: 0

## 2017-07-17 NOTE — TELEPHONE ENCOUNTER
Please obtain special permission for patient to be seen at [facility] for her osteonecrosis of bone. Thank you.   Dr. Hall placed the referral in April 2017

## 2017-07-17 NOTE — PATIENT INSTRUCTIONS
We'll see you in 5 weeks. Have the HIV test drawn by then. Hopefully we will have some answers from [place] about special permission.   Try to exercise 30 min a day.  Try to quite smoking as much as possible as this is a cause of osteo necreosis - and it can make it worse.

## 2017-07-17 NOTE — PROGRESS NOTES
Established Patient    Vivian Meredith presents today with the following:    CC: Follow up with referrals and med refills    HPI: Patient is a pleasant 37-year-old female who was diagnosed with osteonecrosis of bone approximately 2 years ago. The cause is still not been found. She has been followed by a rheumatologist, orthopedic specialists, oncologist - (Dr. Hall, who referred her to [facility] but [facility] was unable to see her due to insurance), PCP and pain specialists. She is currently using fentanyl patches and Percocet. She borrows lidocaine patches from her mother because her insurance won't cover them. She endorses preferring lidocaine patches over fentanyl patches.  It is unclear if we have all the records for this patient and all the tests that have been done to rule out different causes. Patient states that she remembers being tested for different causes such as: sickle cell disease, lupus, HIV, alcohol (rheumatologist stated that her presentation is not typical of an alcohol-induced osteonecrosis - alcohol-induced would be unilateral, and not diffuse as hers is) and tobacco. She has a history of binge drinking in the past and has been smoking cigarettes off and on since she was 18 years old. Smokes approximately 2 cigarettes to one pack per day.  All the tests that have been done on this patient, the only thing that has been positive is a high homocystine level of 13. During last visit she was put on folic acid and her homocystine level decreased now to 10.     Patient Active Problem List    Diagnosis Date Noted   • Avascular necrosis of bone (CMS-HCC) 02/01/2017       Current Outpatient Prescriptions   Medication Sig Dispense Refill   • Furosemide (LASIX PO) Take  by mouth.     • VOLTAREN 1 % Gel Apply 1 Tube to affected area(s) 3 times a day as needed.     • folic acid (FOLVITE) 1 MG Tab Take 1 Tab by mouth every day. 90 Tab 3   • alendronate (FOSAMAX) 70 MG Tab Take 1 Tab by mouth every 7 days. 8 Tab 3  "  • aspirin EC (ECOTRIN) 81 MG Tablet Delayed Response Take 1 Tab by mouth every day. 30 Tab 6   • fentanyl (DURAGESIC) 75 MCG/HR PATCH 72 HR Apply 1 Patch to skin as directed every 72 hours. 5 Patch 0   • prochlorperazine (COMPAZINE) 10 MG Tab      • oxycodone-acetaminophen (PERCOCET-10)  MG Tab Take 1-2 Tabs by mouth every four hours as needed for Severe Pain.     • alprazolam (XANAX) 2 MG tablet Take 2 mg by mouth at bedtime as needed for Sleep.       No current facility-administered medications for this visit.       Social History     Social History   • Marital Status: Legally      Spouse Name: N/A   • Number of Children: N/A   • Years of Education: N/A     Occupational History   • Not on file.     Social History Main Topics   • Smoking status: Current Every Day Smoker -- 0.50 packs/day     Types: Cigarettes   • Smokeless tobacco: Never Used   • Alcohol Use: Yes      Comment: 10-15 in a week    • Drug Use: Yes     Special: Marijuana      Comment: marijuana occasionally   • Sexual Activity: Not on file     Other Topics Concern   • Not on file     Social History Narrative    ** Merged History Encounter **            No family history on file.    ROS: As per HPI. Additional pertinent symptoms as noted below.    Review of Systems   Constitutional: Positive for weight loss and malaise/fatigue. Negative for fever.   Eyes: Negative for blurred vision.   Respiratory: Negative for cough.    Cardiovascular: Negative for chest pain.   Gastrointestinal: Negative for abdominal pain and constipation.   Musculoskeletal: Negative for myalgias.   Skin: Positive for rash.        Endorses rash were fentanyl patch was.   Neurological: Negative for dizziness, sensory change, focal weakness, weakness and headaches.       /84 mmHg  Pulse 107  Temp(Src) 37.2 °C (98.9 °F)  Ht 1.727 m (5' 7.99\")  Wt 102.876 kg (226 lb 12.8 oz)  BMI 34.49 kg/m2  SpO2 95%    Physical Exam  General:  Alert and oriented, moderate " distress. Obese.    Eyes: Pupils equal and reactive. No scleral icterus.    Throat: Clear no erythema or exudates noted.    Neck: Supple. Mallampati score 0.    Lungs: Clear to auscultation and percussion bilaterally.    Cardiovascular: Regular rate and rhythm. No murmurs, rubs or gallops.    Abdomen:  Benign. No rebound or guarding noted.    Extremities: Bilateral upper and lower extremities with nonpitting trace edema, and skin is slightly erythematous. No clubbing, cyanosis. No tenderness to palpation over multiple bilateral joints.    Skin: Rash under fentanyl patches      Assessment and Plan    1. Avascular necrosis of bone (CMS-HCC)  -Diagnosed approximately 2 years ago  -Elevated homocysteine levels (from 13 to 10 after folic acid) has been the only test has been positive.  -Previously tested for and ruled out: Lupus, sickle cell (per patient, cannot find records), alcohol, abnormal karyotype  -Tests ordered previously: Echo, flow cytometry, bone marrow biopsy, beta-2 glycoprotein IgG/IgM,  electrophoresis, antiphospholipid, a.m. cortisol, Apolipoprotein all within normal limits  -Last bone scan September 2016 - showing diffuse osteonecrosis  -Recent MRIs of the hip and feet show continued disease without improvement      Plan:  -Continue alendronate - refilled prescription  -Continue folic acid supplements - as this has improved homocystine levels  -Obtain special permission for [place] Medicaid at [place]  -Follow up in 5 weeks  -Pending HIV, CBC, CMP, UA, TSH/T4, HbA1c  -Consider sickle cell workup - if unable to find records of previous tests.      2. Preventive health   -Discuss during next visit in 5 weeks    Followup: Return in about 5 weeks (around 8/21/2017).      Signed by: Gia Armijo M.D.

## 2017-07-17 NOTE — MR AVS SNAPSHOT
"Vivian Kaur   2017 3:30 PM   Office Visit   MRN: [id number]    Department:  [facility] Med - Internal Med   Dept Phone:  [phone]    Description:  Female : 1980   Provider:  Gia Armijo M.D.           Reason for Visit     Follow-Up needs to establish care brought in recent MRI records of hips and legs    Other would like to talk about getting a referral for pain management      Allergies as of 2017     Allergen Noted Reactions    Ativan 2017       Black out     Haldol [Haloperidol] 2017       Black out     Pcn [Penicillins] 2009       Pcn [Penicillins] 2009         You were diagnosed with     Avascular necrosis of bone (CMS-HCC)   [935040]       Screening for HIV without presence of risk factors   [842180]         Vital Signs     Blood Pressure Pulse Temperature Height Weight Body Mass Index    124/84 mmHg 107 37.2 °C (98.9 °F) 1.727 m (5' 7.99\") 102.876 kg (226 lb 12.8 oz) 34.49 kg/m2    Oxygen Saturation Smoking Status                95% Current Every Day Smoker          Basic Information     Date Of Birth Sex Race Ethnicity Preferred Language    1980 Female White Unknown English      Your appointments     Aug 21, 2017  2:00 PM   Established Patient with Gia Armijo M.D.   [facility] Med - Internal Medicine (--)    [address] [id number] [phone]           You will be receiving a confirmation call a few days before your appointment from our automated call confirmation system.              Problem List              ICD-10-CM Priority Class Noted - Resolved    Avascular necrosis of bone (CMS-HCC) M87.00   2017 - Present      Health Maintenance        Date Due Completion Dates    IMM DTaP/Tdap/Td Vaccine (6 - Tdap) 3/27/1997 3/26/1997, 3/17/1987, 1984, 1983, 1/3/1983, 1980    PAP SMEAR 2003 ---    IMM INFLUENZA (1) 2017 ---            Current Immunizations     Dtap Vaccine " 3/17/1987, 4/18/1984, 4/6/1983, 1/3/1983, 1982    HIB Vaccine(PEDVAX) 5/6/1986    MMR Vaccine 3/26/1997, 9/30/1983    OPV - Historical Data 3/17/1987, 4/6/1983, 1/3/1983, 1982    TD Vaccine 3/26/1997      Below and/or attached are the medications your provider expects you to take. Review all of your home medications and newly ordered medications with your provider and/or pharmacist. Follow medication instructions as directed by your provider and/or pharmacist. Please keep your medication list with you and share with your provider. Update the information when medications are discontinued, doses are changed, or new medications (including over-the-counter products) are added; and carry medication information at all times in the event of emergency situations     Allergies:  ATIVAN - (reactions not documented)     HALDOL - (reactions not documented)     PCN - (reactions not documented)     PCN - (reactions not documented)               Medications  Valid as of: July 17, 2017 -  4:37 PM    Generic Name Brand Name Tablet Size Instructions for use    Alendronate Sodium (Tab) FOSAMAX 70 MG Take 1 Tab by mouth every 7 days.        ALPRAZolam (Tab) XANAX 2 MG Take 2 mg by mouth at bedtime as needed for Sleep.        Aspirin (Tablet Delayed Response) ECOTRIN 81 MG Take 1 Tab by mouth every day.        Diclofenac Sodium (Gel) VOLTAREN 1 % Apply 1 Tube to affected area(s) 3 times a day as needed.        FentaNYL (PATCH 72 HR) DURAGESIC 75 MCG/HR Apply 1 Patch to skin as directed every 72 hours.        Folic Acid (Tab) FOLVITE 1 MG Take 1 Tab by mouth every day.        Furosemide   Take  by mouth.        Oxycodone-Acetaminophen (Tab) PERCOCET-10  MG Take 1-2 Tabs by mouth every four hours as needed for Severe Pain.        Prochlorperazine Maleate (Tab) COMPAZINE 10 MG         .                 Medicines prescribed today were sent to:     [facility] [address] [place] [address], [place] - [address] [place] [address]  AILYN SOLER [address]    Phone: [phone] Fax: [phone]    Open 24 Hours?: No      Medication refill instructions:       If your prescription bottle indicates you have medication refills left, it is not necessary to call your provider’s office. Please contact your pharmacy and they will refill your medication.    If your prescription bottle indicates you do not have any refills left, you may request refills at any time through one of the following ways: The online TappnGo system (except Urgent Care), by calling your provider’s office, or by asking your pharmacy to contact your provider’s office with a refill request. Medication refills are processed only during regular business hours and may not be available until the next business day. Your provider may request additional information or to have a follow-up visit with you prior to refilling your medication.   *Please Note: Medication refills are assigned a new Rx number when refilled electronically. Your pharmacy may indicate that no refills were authorized even though a new prescription for the same medication is available at the pharmacy. Please request the medicine by name with the pharmacy before contacting your provider for a refill.        Your To Do List     Future Labs/Procedures Complete By Expires    HIV ANTIBODIES  As directed 7/17/2018      Referral     A referral request has been sent to our patient care coordination department. Please allow 3-5 business days for us to process this request and contact you either by phone or mail. If you do not hear from us by the 5th business day, please call us at [phone].        Instructions    We'll see you in 5 weeks. Have the HIV test drawn by then. Hopefully we will have some answers from [place] about special permission.   Try to exercise 30 min a day.  Try to quite smoking as much as possible as this is a cause of osteo necreosis - and it can make it worse.            TappnGo Access Code: Activation  code not generated  Current MyChart Status: Active          Quit Tobacco Information     Do you want to quit using tobacco?    Quitting tobacco decreases risks of cancer, heart and lung disease, increases life expectancy, improves sense of taste and smell, and increases spending money, among other benefits.    If you are thinking about quitting, we can help.  • [facility] Quit Tobacco Program: 413.365.7563  o Program occurs weekly for four weeks and includes pharmacist consultation on products to support quitting smoking or chewing tobacco. A provider referral is needed for pharmacist consultation.  • Tobacco Users Help Hotline: 5-446-QUIT-NOW (462-1385) or https://[place].quitlogix.org/  o Free, confidential telephone and online coaching for [place] residents. Sessions are designed on a schedule that is convenient for you. Eligible clients receive free nicotine replacement therapy.  • Nationally: www.smokefree.gov  o Information and professional assistance to support both immediate and long-term needs as you become, and remain, a non-smoker. Smokefree.gov allows you to choose the help that best fits your needs.

## 2017-07-18 NOTE — TELEPHONE ENCOUNTER
Submitted auth to pt's insurance for [facility] waiting on status have also faxed referral to [facility] to review records

## 2017-08-21 ENCOUNTER — OFFICE VISIT (OUTPATIENT)
Dept: INTERNAL MEDICINE | Facility: MEDICAL CENTER | Age: 37
End: 2017-08-21
Payer: MEDICAID

## 2017-08-21 ENCOUNTER — TELEPHONE (OUTPATIENT)
Dept: INTERNAL MEDICINE | Facility: MEDICAL CENTER | Age: 37
End: 2017-08-21

## 2017-08-21 VITALS
SYSTOLIC BLOOD PRESSURE: 131 MMHG | TEMPERATURE: 98.4 F | BODY MASS INDEX: 35.79 KG/M2 | WEIGHT: 228 LBS | HEART RATE: 82 BPM | DIASTOLIC BLOOD PRESSURE: 82 MMHG | OXYGEN SATURATION: 95 % | HEIGHT: 67 IN

## 2017-08-21 DIAGNOSIS — M87.00 AVASCULAR NECROSIS OF BONE (HCC): ICD-10-CM

## 2017-08-21 DIAGNOSIS — F33.9 EPISODE OF RECURRENT MAJOR DEPRESSIVE DISORDER, UNSPECIFIED DEPRESSION EPISODE SEVERITY (HCC): ICD-10-CM

## 2017-08-21 DIAGNOSIS — E66.9 OBESITY (BMI 35.0-39.9 WITHOUT COMORBIDITY): ICD-10-CM

## 2017-08-21 PROBLEM — F32.9 MAJOR DEPRESSIVE DISORDER: Status: ACTIVE | Noted: 2017-08-21

## 2017-08-21 PROCEDURE — 99214 OFFICE O/P EST MOD 30 MIN: CPT | Mod: GC | Performed by: INTERNAL MEDICINE

## 2017-08-21 RX ORDER — OXYCODONE HCL 40 MG/1
40 TABLET, FILM COATED, EXTENDED RELEASE ORAL EVERY 12 HOURS
COMMUNITY
End: 2018-06-22 | Stop reason: SDUPTHER

## 2017-08-21 ASSESSMENT — ENCOUNTER SYMPTOMS
SENSORY CHANGE: 0
HEADACHES: 0
HEARTBURN: 0
FEVER: 0
ABDOMINAL PAIN: 0
COUGH: 0
NERVOUS/ANXIOUS: 1
DEPRESSION: 1
DIZZINESS: 0
WEAKNESS: 0
WEIGHT LOSS: 0

## 2017-08-21 ASSESSMENT — PATIENT HEALTH QUESTIONNAIRE - PHQ9
5. POOR APPETITE OR OVEREATING: 3 - NEARLY EVERY DAY
SUM OF ALL RESPONSES TO PHQ QUESTIONS 1-9: 20
CLINICAL INTERPRETATION OF PHQ2 SCORE: 6

## 2017-08-21 NOTE — LETTER
September 20, 2017      Vivian Kaur  [address]  [place] [address]    Dear Vivian Kaur,  Our clinic has been trying to reach you many times and have been unsuccessful. We wanted to let you know that in order for us to proceed with the Orthopaedic referral you needed to get an MRI done.  The order has been placed and all you need to do is call [facility] at [phone].          Thank you      Lala Menendez

## 2017-08-21 NOTE — PROGRESS NOTES
Established Patient    Vivian Meredith presents today with the following:    CC: Follow up with referrals and labs    HPI: Patient is a pleasant 37-year-old female who was diagnosed with osteonecrosis of bone approximately 2 years ago. The cause is still not been found. She has been followed by a rheumatologist, orthopedic specialists, oncologist - (Dr. Hall, who referred her to [place] but [place] was unable to see her due to insurance - they're currently trying to refer her to [place] or [facility]), PCP and pain specialists. She is currently using oxycodone and trying to wean off Percocet. Her insurance denied her lidocaine patches.   It is unclear if we have all the records for this patient and all the tests that have been done to rule out different causes. Patient states that she remembers being tested for different causes such as:  lupus, HIV, alcohol (rheumatologist stated that her presentation is not typical of an alcohol-induced osteonecrosis - alcohol-induced would be unilateral, and not diffuse as hers is) and tobacco. She has a history of binge drinking in the past and has been smoking cigarettes off and on since she was 18 years old. Smokes approximately 2 cigarettes to one pack per day.  All the tests that have been done on this patient, the only thing that has been positive is a high homocystine level of 13. During last visit she was put on folic acid and her homocystine level decreased now to 10.      Patient Active Problem List    Diagnosis Date Noted   • Obesity (BMI 35.0-39.9 without comorbidity) ([facility]) 08/21/2017   • Major depressive disorder 08/21/2017   • Avascular necrosis of bone (CMS-HCC) 02/01/2017       Current Outpatient Prescriptions   Medication Sig Dispense Refill   • oxyCODONE CR (OXYCONTIN) 40 MG Tablet Extended Release 12 hour Abuse-Deterrent tablet Take 40 mg by mouth every 12 hours.     • VOLTAREN 1 % Gel Apply 1 Tube to affected area(s) 3 times a day as needed.     • alendronate  (FOSAMAX) 70 MG Tab Take 1 Tab by mouth every 7 days. 8 Tab 3   • aspirin EC (ECOTRIN) 81 MG Tablet Delayed Response Take 1 Tab by mouth every day. 30 Tab 6   • prochlorperazine (COMPAZINE) 10 MG Tab      • oxycodone-acetaminophen (PERCOCET-10)  MG Tab Take 1-2 Tabs by mouth every four hours as needed for Severe Pain.     • alprazolam (XANAX) 2 MG tablet Take 2 mg by mouth at bedtime as needed for Sleep.     • Furosemide (LASIX PO) Take  by mouth.     • folic acid (FOLVITE) 1 MG Tab Take 1 Tab by mouth every day. 90 Tab 3     No current facility-administered medications for this visit.       Social History     Social History   • Marital Status: Legally      Spouse Name: N/A   • Number of Children: N/A   • Years of Education: N/A     Occupational History   • Not on file.     Social History Main Topics   • Smoking status: Current Every Day Smoker -- 0.50 packs/day     Types: Cigarettes   • Smokeless tobacco: Never Used   • Alcohol Use: 4.2 - 6.0 oz/week     7-10 Cans of beer per week      Comment: 7-10 in a week    • Drug Use: No      Comment: marijuana occasionally   • Sexual Activity: Not on file     Other Topics Concern   • Not on file     Social History Narrative    ** Merged History Encounter **            No family history on file.    ROS: As per HPI. Additional pertinent symptoms as noted below.    Review of Systems   Constitutional: Positive for malaise/fatigue. Negative for fever and weight loss.        Feels extremely sleepy after she eats meals   Respiratory: Negative for cough.    Cardiovascular: Negative for chest pain.   Gastrointestinal: Negative for heartburn and abdominal pain.   Genitourinary: Negative for dysuria.   Musculoskeletal: Positive for joint pain.        Continued severe bilateral hip/ankle/wrist pain. Right ankle more than any other place.   Neurological: Negative for dizziness, sensory change, weakness and headaches.   Psychiatric/Behavioral: Positive for depression.  "Negative for suicidal ideas. The patient is nervous/anxious.         Continues to feel depressed, but denies suicidal ideation       /82 mmHg  Pulse 82  Temp(Src) 36.9 °C (98.4 °F)  Ht 1.702 m (5' 7\")  Wt 103.42 kg (228 lb)  BMI 35.70 kg/m2  SpO2 95%    Physical Exam  General:  Alert and oriented, No apparent distress. Well groomed, cooperative. Obese.    Eyes: Pupils equal and reactive. No scleral icterus.    Throat: Clear no erythema or exudates noted.    Neck: Supple. No lymphadenopathy noted. Thyroid not enlarged.    Lungs: Clear to auscultation and percussion bilaterally.    Cardiovascular: Regular rate and rhythm. No murmurs, rubs or gallops.    Abdomen: Distended, nontender. No rebound or guarding noted.    Extremities: No clubbing, cyanosis, edema. Full range of motion of hips knees and ankles. Extreme hip/knee/ankle pain with weightbearing.    Skin: Clear. No rash or suspicious skin lesions noted.      Assessment and Plan    1. Avascular necrosis of bone   -Diagnosed approximately 2 years ago  -Elevated homocysteine levels (from 13 to 10 after folic acid) has been the only test has been positive.  -Previously tested for and ruled out: Lupus, sickle cell (per patient, cannot find records), alcohol, abnormal karyotype  -Tests ordered previously: Echo, flow cytometry, bone marrow biopsy, beta-2 glycoprotein IgG/IgM,  electrophoresis, antiphospholipid, a.m. cortisol, Apolipoprotein all within normal limits  -Last bone scan September 2016 - showing diffuse osteonecrosis  -Recent MRIs of the hip and feet show continued disease without improvement    Plan:  -Continue alendronate   -Continue folic acid supplements - as this has improved homocystine levels  -Continue to work for approval to see orthopedics in [place] or other.  -Follow up in 5 weeks  -Pending HIV, CBC, CMP, TSH/T4  -Will not do sickle cell workup - called Dr. Hall - he does not believe sickle cell is the cause - patient has " no other clinic presentations suggestive of this disease and BM biopsy did not suggest this either. He did suggest we consider IV biphosphonates if patient unable to see orthopedics in [facility] or [place].     2. Obesity (BMI 35.0-39.9 without comorbidity)   -Patient states that since diagnosis she has gained weight - even though her appetite has decreased and she eats less. She does endorse being more sedentary than before.  -Patient is unable to do strenuous exercise due to bone pain.  -We'll continue to monitor    3. Episode of recurrent major depressive disorder, unspecified depression episode severity (CMS-HCC)  -Continues to feel: anhedonia, depressed mood, insomnia, fatigue, feeling bad about self, trouble concentrating  -PHQ 9 score: 20   -referral to psychiatry placed  -denies SI  -consider SSRI during next visit.         Followup: Return in about 5 weeks (around 9/25/2017).      Signed by: Gia Armijo M.D.

## 2017-08-21 NOTE — TELEPHONE ENCOUNTER
DOCUMENTATION OF PAR STATUS:    1. Name of Referral and Location: [facility] for Ortho surgeon     2. Name of  Company & phone #: [employer] Fax [phone]    3. Date Prior Auth Submitted: 8/21/2017    4. What information was given to obtain insurance decision? Referral intake form, Insurance cards/ ID, and office notes     5. Prior Auth Letter Approved or Denied? Pending    6. Action Taken: Pharmacy/Patient Notified: No

## 2017-08-21 NOTE — MR AVS SNAPSHOT
"Vivian Kaur   2017 2:00 PM   Office Visit   MRN: [id number]    Department:  [facility] Med - Internal Med   Dept Phone:  [phone]    Description:  Female : 1980   Provider:  Gia Armijo M.D.           Reason for Visit     Joint Pain all over body      Allergies as of 2017     Allergen Noted Reactions    Ativan 2017       Black out     Haldol [Haloperidol] 2017       Black out     Pcn [Penicillins] 2009       Pcn [Penicillins] 2009         You were diagnosed with     Obesity (BMI 30-39.9)   [869120]       Obesity (BMI 35.0-39.9 without comorbidity) ([facility])   [357564]       Avascular necrosis of bone (CMS-[facility])   [183923]         Vital Signs     Blood Pressure Pulse Temperature Height Weight Body Mass Index    131/82 mmHg 82 36.9 °C (98.4 °F) 1.702 m (5' 7\") 103.42 kg (228 lb) 35.70 kg/m2    Oxygen Saturation Smoking Status                95% Current Every Day Smoker          Basic Information     Date Of Birth Sex Race Ethnicity Preferred Language    1980 Female White Unknown English      Your appointments     Sep 27, 2017  1:15 PM   Established Patient with Gia Armijo M.D.   [facility] Med - Internal Medicine (--)    [address] [id number] [phone]           You will be receiving a confirmation call a few days before your appointment from our automated call confirmation system.              Problem List              ICD-10-CM Priority Class Noted - Resolved    Avascular necrosis of bone (CMS-HCC) M87.00   2017 - Present    Obesity (BMI 35.0-39.9 without comorbidity) ([facility]) E66.9   2017 - Present      Health Maintenance        Date Due Completion Dates    IMM DTaP/Tdap/Td Vaccine (6 - Tdap) 3/27/1997 3/26/1997, 3/17/1987, 1984, 1983, 1/3/1983, 1982    PAP SMEAR 2003 ---    IMM INFLUENZA (1) 2017 ---            Current Immunizations     Dtap Vaccine 3/17/1987, 1984, " 4/6/1983, 1/3/1983, 1982    HIB Vaccine(PEDVAX) 5/6/1986    MMR Vaccine 3/26/1997, 9/30/1983    OPV - Historical Data 3/17/1987, 4/6/1983, 1/3/1983, 1982    TD Vaccine 3/26/1997      Below and/or attached are the medications your provider expects you to take. Review all of your home medications and newly ordered medications with your provider and/or pharmacist. Follow medication instructions as directed by your provider and/or pharmacist. Please keep your medication list with you and share with your provider. Update the information when medications are discontinued, doses are changed, or new medications (including over-the-counter products) are added; and carry medication information at all times in the event of emergency situations     Allergies:  ATIVAN - (reactions not documented)     HALDOL - (reactions not documented)     PCN - (reactions not documented)     PCN - (reactions not documented)               Medications  Valid as of: August 21, 2017 -  3:12 PM    Generic Name Brand Name Tablet Size Instructions for use    Alendronate Sodium (Tab) FOSAMAX 70 MG Take 1 Tab by mouth every 7 days.        ALPRAZolam (Tab) XANAX 2 MG Take 2 mg by mouth at bedtime as needed for Sleep.        Aspirin (Tablet Delayed Response) ECOTRIN 81 MG Take 1 Tab by mouth every day.        Diclofenac Sodium (Gel) VOLTAREN 1 % Apply 1 Tube to affected area(s) 3 times a day as needed.        Folic Acid (Tab) FOLVITE 1 MG Take 1 Tab by mouth every day.        Furosemide   Take  by mouth.        OxyCODONE HCl (Tablet Extended Release 12 hour Abuse-Deterrent) OXYCONTIN 40 MG Take 40 mg by mouth every 12 hours.        Oxycodone-Acetaminophen (Tab) PERCOCET-10  MG Take 1-2 Tabs by mouth every four hours as needed for Severe Pain.        Prochlorperazine Maleate (Tab) COMPAZINE 10 MG         .                 Medicines prescribed today were sent to:     [facility] [address] [place] [address] [address]  [facility] [address]    Phone: [phone] Fax: [phone]    Open 24 Hours?: No      Medication refill instructions:       If your prescription bottle indicates you have medication refills left, it is not necessary to call your provider’s office. Please contact your pharmacy and they will refill your medication.    If your prescription bottle indicates you do not have any refills left, you may request refills at any time through one of the following ways: The online [employer] system (except Urgent Care), by calling your provider’s office, or by asking your pharmacy to contact your provider’s office with a refill request. Medication refills are processed only during regular business hours and may not be available until the next business day. Your provider may request additional information or to have a follow-up visit with you prior to refilling your medication.   *Please Note: Medication refills are assigned a new Rx number when refilled electronically. Your pharmacy may indicate that no refills were authorized even though a new prescription for the same medication is available at the pharmacy. Please request the medicine by name with the pharmacy before contacting your provider for a refill.        Your To Do List     Future Labs/Procedures Complete By Expires    CBC WITH DIFFERENTIAL  As directed 8/21/2018    COMP METABOLIC PANEL  As directed 8/21/2018      Referral     A referral request has been sent to our patient care coordination department. Please allow 3-5 business days for us to process this request and contact you either by phone or mail. If you do not hear from us by the 5th business day, please call us at [phone].        Instructions    Referral to orthopedics, rheumatology, and endocrinology.   Do labs before i see you again.             [employer] Access Code: [email or web address]  Expires: 9/20/2017  3:12 PM    [employer]  A secure, online tool to manage your health information     [employer]’s  Vehrity® is a secure, online tool that connects you to your personalized health information from the privacy of your home -- day or night - making it very easy for you to manage your healthcare. Once the activation process is completed, you can even access your medical information using the Vehrity romina, which is available for free in the Apple Romina store or Google Play store.     Vehrity provides the following levels of access (as shown below):   My Chart Features   [facility] Doctor [facility]  Specialists [facility]  Urgent  Care Non-[facility]  Primary Care  Doctor   Email your healthcare team securely and privately 24/7 X X X    Manage appointments: schedule your next appointment; view details of past/upcoming appointments X      Request prescription refills. X      View recent personal medical records, including lab and immunizations X X X X   View health record, including health history, allergies, medications X X X X   Read reports about your outpatient visits, procedures, consult and ER notes X X X X   See your discharge summary, which is a recap of your hospital and/or ER visit that includes your diagnosis, lab results, and care plan. X X       How to register for Vehrity:  1. Go to  [email or web address].  2. Click on the Sign Up Now box, which takes you to the New Member Sign Up page. You will need to provide the following information:  a. Enter your Vehrity Access Code exactly as it appears at the top of this page. (You will not need to use this code after you’ve completed the sign-up process. If you do not sign up before the expiration date, you must request a new code.)   b. Enter your date of birth.   c. Enter your home email address.   d. Click Submit, and follow the next screen’s instructions.  3. Create a Vehrity ID. This will be your Vehrity login ID and cannot be changed, so think of one that is secure and easy to remember.  4. Create a Vehrity password. You can change your password at any  time.  5. Enter your Password Reset Question and Answer. This can be used at a later time if you forget your password.   6. Enter your e-mail address. This allows you to receive e-mail notifications when new information is available in [employer].  7. Click Sign Up. You can now view your health information.    For assistance activating your [employer] account, call [phone]        Quit Tobacco Information     Do you want to quit using tobacco?    Quitting tobacco decreases risks of cancer, heart and lung disease, increases life expectancy, improves sense of taste and smell, and increases spending money, among other benefits.    If you are thinking about quitting, we can help.  • [facility] Quit Tobacco Program: [phone]  o Program occurs weekly for four weeks and includes pharmacist consultation on products to support quitting smoking or chewing tobacco. A provider referral is needed for pharmacist consultation.  • Tobacco Users Help Hotline: 3-800QUIT-NOW (556-7287) or https://[place].quitlogix.org/  o Free, confidential telephone and online coaching for [place] residents. Sessions are designed on a schedule that is convenient for you. Eligible clients receive free nicotine replacement therapy.  • Nationally: www.smokefree.gov  o Information and professional assistance to support both immediate and long-term needs as you become, and remain, a non-smoker. Smokefree.gov allows you to choose the help that best fits your needs.

## 2017-08-28 NOTE — TELEPHONE ENCOUNTER
Called [facility] to check on the status and they stated it got denied on the 25 of August due to capacity.

## 2017-08-29 ENCOUNTER — TELEPHONE (OUTPATIENT)
Dept: HEMATOLOGY ONCOLOGY | Facility: MEDICAL CENTER | Age: 37
End: 2017-08-29

## 2017-09-13 NOTE — TELEPHONE ENCOUNTER
Called and talked to Damian and he stated that patient can be seen here but she needs to have the MRI done first.  Damian stated that the MRI has been order and patient just needs to call and make an appointment.     Called patient and left message to call me back.

## 2017-09-14 NOTE — TELEPHONE ENCOUNTER
Thank you. Please call patient again in a couple days if she doesn't call you back. This is a great opportunity. I would hate for her to miss it. Thank you

## 2017-09-20 NOTE — TELEPHONE ENCOUNTER
Called patient again today and unable to speak with patient.  Left voicemail to return my call.  Have tried many times to attempt and reach patient multiple times and have not been succesfull. Will mail out a letter stating for her to call the radiology department and make an appointment to have her MRI done.

## 2017-11-28 ENCOUNTER — OFFICE VISIT (OUTPATIENT)
Dept: ENDOCRINOLOGY | Facility: MEDICAL CENTER | Age: 37
End: 2017-11-28
Payer: MEDICAID

## 2017-11-28 VITALS
OXYGEN SATURATION: 96 % | SYSTOLIC BLOOD PRESSURE: 130 MMHG | WEIGHT: 242.9 LBS | BODY MASS INDEX: 38.12 KG/M2 | HEART RATE: 82 BPM | DIASTOLIC BLOOD PRESSURE: 80 MMHG | HEIGHT: 67 IN

## 2017-11-28 DIAGNOSIS — M87.00 AVASCULAR NECROSIS OF BONE (HCC): ICD-10-CM

## 2017-11-28 DIAGNOSIS — E66.9 OBESITY (BMI 35.0-39.9 WITHOUT COMORBIDITY): ICD-10-CM

## 2017-11-28 PROCEDURE — 99204 OFFICE O/P NEW MOD 45 MIN: CPT | Performed by: INTERNAL MEDICINE

## 2017-12-04 NOTE — PROGRESS NOTES
New Patient Consult Note  Primary care physician: Gia Armijo M.D.    Reason for consult: Diffuse osteonecrosis of the bone    HPI:  Vivian Kaur is a 37 y.o. old patient who comes in today for evaluation of diffuse osteonecrosis of the bone. Although based on the history it appears that patient always had symptoms all along including bony pain for probably a long time however things have worsened over the past couple of years or so. This issue has received attention only recently after she complained of left ankle and right foot pain that were worsening over a period of time and that prompted MRI studies. The MRI studies of the foot 82 and subsequently the whole body shows diffuse osteonecrosis. No etiology has been identified so far.    ROS:  Constitutional: Left ankle and right foot pain , diffuse bony pains, No weight loss  Cardiac: No palpitations or racing heart  Resp: No shortness of breath  Neuro: No numbness or tinging in feet  Endo: No heat or cold intolerance, no polyuria or polydipsia  All other systems were reviewed and were negative.    Past Medical History:  Patient Active Problem List    Diagnosis Date Noted   • Obesity (BMI 35.0-39.9 without comorbidity) 08/21/2017   • Major depressive disorder 08/21/2017   • Avascular necrosis of bone (CMS-HCC) 02/01/2017       Past Surgical History:  Past Surgical History:   Procedure Laterality Date   • BONE MARROW BIOPSY, NDL/TROCAR  3/14/2017    Procedure: BONE MARROW BIOPSY, NDL/TROCAR;  Surgeon: Gary Keating D.O.;  Location: [facility];  Service:    • BONE MARROW ASPIRATION  3/14/2017    Procedure: BONE MARROW ASPIRATION;  Surgeon: Gary Keating D.O.;  Location: ENDOSCOPY [facility];  Service:    • ARTERY EXPLORATION OR REPAIR  11/9/2009    Performed by ELISABETH MORALEZ at SURGERY [facility]   • CHOLECYSTECTOMY     • OTHER ABDOMINAL SURGERY      appy       Allergies:  Ativan; Haldol [haloperidol]; Pcn  [penicillins]; Pcn [penicillins]; and Percocet [perloxx]    Social History:  Social History     Social History   • Marital status: Legally      Spouse name: N/A   • Number of children: N/A   • Years of education: N/A     Occupational History   • Not on file.     Social History Main Topics   • Smoking status: Former Smoker     Packs/day: 0.50     Types: Cigarettes   • Smokeless tobacco: Never Used   • Alcohol use 4.2 - 6.0 oz/week     7 - 10 Cans of beer per week      Comment: 7-10 in a week    • Drug use: No      Comment: marijuana occasionally   • Sexual activity: Not on file     Other Topics Concern   • Not on file     Social History Narrative    ** Merged History Encounter **            Family History:  History reviewed. No pertinent family history.    Medications:    Current Outpatient Prescriptions:   •  B Complex-Biotin-FA (B COMPLETE PO), Take  by mouth., Disp: , Rfl:   •  Cholecalciferol (VITAMIN D3) 5000 units Tab, Take  by mouth., Disp: , Rfl:   •  Magnesium 100 MG Tab, Take  by mouth., Disp: , Rfl:   •  coenzyme Q-10 30 MG capsule, Take 60 mg by mouth every day., Disp: , Rfl:   •  oxyCODONE CR (OXYCONTIN) 40 MG Tablet Extended Release 12 hour Abuse-Deterrent tablet, Take 40 mg by mouth every 12 hours., Disp: , Rfl:   •  Furosemide (LASIX PO), Take  by mouth., Disp: , Rfl:   •  VOLTAREN 1 % Gel, Apply 1 Tube to affected area(s) 3 times a day as needed., Disp: , Rfl:   •  folic acid (FOLVITE) 1 MG Tab, Take 1 Tab by mouth every day., Disp: 90 Tab, Rfl: 3  •  aspirin EC (ECOTRIN) 81 MG Tablet Delayed Response, Take 1 Tab by mouth every day., Disp: 30 Tab, Rfl: 6  •  prochlorperazine (COMPAZINE) 10 MG Tab, , Disp: , Rfl:   •  oxycodone-acetaminophen (PERCOCET-10)  MG Tab, Take 1-2 Tabs by mouth every four hours as needed for Severe Pain., Disp: , Rfl:   •  alprazolam (XANAX) 2 MG tablet, Take 2 mg by mouth at bedtime as needed for Sleep., Disp: , Rfl:   •  alendronate (FOSAMAX) 70 MG Tab, Take  "1 Tab by mouth every 7 days., Disp: 8 Tab, Rfl: 3    Labs: Reviewed    Physical Examination:  Vital signs: /80   Pulse 82   Ht 1.702 m (5' 7\")   Wt 110.2 kg (242 lb 14.4 oz)   SpO2 96%   BMI 38.04 kg/m²  Body mass index is 38.04 kg/m².  General: No apparent distress, cooperative, appears older than stated age  Eyes: No scleral icterus or discharge  ENMT: Normal on external inspection of nose, lips, normal thyroid exam  Neck: No abnormal masses on inspection  Resp: Normal effort, clear to auscultation bilaterally   CVS: Regular rate and rhythm, S1 S2 normal, no murmur   Extremities: No edema  Abdomen: abdominal obesity present  Neuro: Alert and oriented  Skin: No rash  Psych: Normal mood and affect, intact memory and able to make informed decisions    Assessment and Plan:    1. Avascular necrosis of bone (CMS-HCC)  Rule out hypophosphatasia as a contributory factor for avascular necrosis of the bone.  The other possibility is the Paget's disease of the bone; however usually this is a diagnosis of exclusion.  I will be in touch with her over the phone to ensure that we have the right workup done for hypophosphatasia.    2. Obesity (BMI 35.0-39.9 without comorbidity)  Briefly discussed some dietary and exercise measures to help facilitate weight loss and this would potentially help the spine and in the knee joints as there will be less stress and strain on them.    Return in about 1 month (around 12/28/2017).     Thank you for allowing me to participate in the care of this patient.    Vincent Al M.D.  12/04/17    CC:   Gia Armijo M.D.    This note was created using voice recognition software (Dragon). The accuracy of the dictation is limited by the abilities of the software. I have reviewed the note prior to signing, however some errors in grammar and context are still possible. If you have any questions related to this note please do not hesitate to contact our office.     "

## 2018-01-15 ENCOUNTER — TELEPHONE (OUTPATIENT)
Dept: ENDOCRINOLOGY | Facility: MEDICAL CENTER | Age: 38
End: 2018-01-15

## 2018-01-17 ENCOUNTER — OFFICE VISIT (OUTPATIENT)
Dept: ENDOCRINOLOGY | Facility: MEDICAL CENTER | Age: 38
End: 2018-01-17
Payer: MEDICAID

## 2018-01-17 VITALS
SYSTOLIC BLOOD PRESSURE: 112 MMHG | HEIGHT: 67 IN | DIASTOLIC BLOOD PRESSURE: 78 MMHG | HEART RATE: 109 BPM | OXYGEN SATURATION: 93 % | WEIGHT: 246.3 LBS | BODY MASS INDEX: 38.66 KG/M2

## 2018-01-17 DIAGNOSIS — E83.39 JUVENILE HYPOPHOSPHATASIA: ICD-10-CM

## 2018-01-17 DIAGNOSIS — M87.00 AVASCULAR NECROSIS OF BONE (HCC): ICD-10-CM

## 2018-01-17 PROCEDURE — 99214 OFFICE O/P EST MOD 30 MIN: CPT | Performed by: INTERNAL MEDICINE

## 2018-01-17 NOTE — PROGRESS NOTES
Endocrinology Clinic Progress Note  PCP: Gia Armijo M.D.    HPI:  Vivian Kaur is a 37 y.o. old patient who comes in today for routine follow up.   Based on her history as per my previous visit and her symptoms it appears that she may have hypophosphatasia. I have ordered the saliva kits for her for genetic testing for this rare disorder.   Upon further questioning patient also states that she had to wear braces/casts in both the foot from 3 months of age till 3-6 years of age because of the foot deformity. (Although the mother and the father of the patient states that it was not clubfoot but it was a slight angular deformity). Patient also has a history of a very advanced eva-odontal disease that was diagnosed for the first time in 2009. Her dentist had recommended molar tooth extractions a while back but she doesn't have dental insurance and the resources to be able to do so.       Seems that her mother and her uncle (mother's brother) may also have this particular condition. The patient tests positive for this there is a plan to test the other relatives as well.      Uncle has a history of dual hip replacement at the age of 56 and that too is not healing well. Her uncle  also has a history of wearing foot casts from birth till 3-4 years of age.      ROS:  Constitutional: Generalized muscle aches and pains, generalized joint pains, difficulty walking because of the foot problems secondary to avascular necrosis, No unintentional weight loss  Endo: Denies excessive thirst or frequent urination  All other systems were reviewed and were negative.    Past Medical History:  Patient Active Problem List    Diagnosis Date Noted   • Obesity (BMI 35.0-39.9 without comorbidity) 08/21/2017   • Major depressive disorder 08/21/2017   • Avascular necrosis of bone (CMS-HCC) 02/01/2017       Medications:    Current Outpatient Prescriptions:   •  Milk Thistle 1000 MG Cap, Take 1 Tab by mouth every day., Disp: ,  "Rfl:   •  Magnesium 100 MG Tab, Take  by mouth., Disp: , Rfl:   •  oxyCODONE CR (OXYCONTIN) 40 MG Tablet Extended Release 12 hour Abuse-Deterrent tablet, Take 40 mg by mouth every 12 hours., Disp: , Rfl:   •  VOLTAREN 1 % Gel, Apply 1 Tube to affected area(s) 3 times a day as needed., Disp: , Rfl:   •  folic acid (FOLVITE) 1 MG Tab, Take 1 Tab by mouth every day., Disp: 90 Tab, Rfl: 3  •  aspirin EC (ECOTRIN) 81 MG Tablet Delayed Response, Take 1 Tab by mouth every day., Disp: 30 Tab, Rfl: 6  •  prochlorperazine (COMPAZINE) 10 MG Tab, , Disp: , Rfl:   •  oxycodone-acetaminophen (PERCOCET-10)  MG Tab, Take 1-2 Tabs by mouth every four hours as needed for Severe Pain., Disp: , Rfl:   •  alprazolam (XANAX) 2 MG tablet, Take 2 mg by mouth at bedtime as needed for Sleep., Disp: , Rfl:   •  B Complex-Biotin-FA (B COMPLETE PO), Take  by mouth., Disp: , Rfl:   •  Cholecalciferol (VITAMIN D3) 5000 units Tab, Take  by mouth., Disp: , Rfl:   •  coenzyme Q-10 30 MG capsule, Take 60 mg by mouth every day., Disp: , Rfl:   •  Furosemide (LASIX PO), Take  by mouth., Disp: , Rfl:   •  alendronate (FOSAMAX) 70 MG Tab, Take 1 Tab by mouth every 7 days., Disp: 8 Tab, Rfl: 3    Labs: Reviewed    Physical Examination:  Vital signs: /78   Pulse (!) 109   Ht 1.702 m (5' 7.01\")   Wt 111.7 kg (246 lb 4.8 oz)   SpO2 93%   BMI 38.57 kg/m²  Body mass index is 38.57 kg/m².  General: No apparent distress, cooperative  Eyes: No scleral icterus, no discharge, normal eyelids  Neck: No abnormal masses on inspection, normal thyroid exam  Resp: Normal effort, clear to auscultation bilaterally  CVS: Regular rate and rhythm, S1 S2 normal, no murmur  Extremities: No lower extremity edema  Abdomen: abdominal obesity present  Musculoskeletal: Normal digits and nails  Skin: No rash on visible skin  Psych: Alert and oriented, normal mood and affect, intact memory and able to make informed decisions.    Assessment and Plan:    1. Avascular " necrosis of bone (CMS-HCC)  Could be secondary to hypophosphatasia; plan to do genetic testing shortly; will test her mom and uncle if needed.    2. Juvenile hypophosphatasia  Plan as per above.    Return in about 2 months (around 3/17/2018).    Thank you for allowing me to participate in the care of this patient.    Vincent Al M.D.    CC:   Gia Armijo M.D.    This note was created using voice recognition software (Dragon). The accuracy of the dictation is limited by the abilities of the software. I have reviewed the note prior to signing, however some errors in grammar and context are still possible. If you have any questions related to this note please do not hesitate to contact our office.

## 2018-01-29 ENCOUNTER — TELEPHONE (OUTPATIENT)
Dept: INTERNAL MEDICINE | Facility: MEDICAL CENTER | Age: 38
End: 2018-01-29

## 2018-01-29 DIAGNOSIS — M87.00 AVASCULAR NECROSIS OF BONE (HCC): ICD-10-CM

## 2018-01-29 NOTE — TELEPHONE ENCOUNTER
VOICEMAIL  1. Caller Name: Vivian Kaur  Call Back Number: [phone] (home)       2. Message: Has been referred to pain clinic a couple of times now and has not heard anything from anyone. She would like the phone number to where we referred her to.  Stating that she has been receiving pain medication from a different doctor and is in crutionating pain.  If no one helps her out she will check her self into the hospital.  Patient states she only has 2 weeks left worth of medication.    3. Patient approves office to leave a detailed voicemail/MyChart message: N\A

## 2018-01-30 ENCOUNTER — HOSPITAL ENCOUNTER (OUTPATIENT)
Dept: RADIOLOGY | Facility: MEDICAL CENTER | Age: 38
End: 2018-01-30

## 2018-01-30 NOTE — TELEPHONE ENCOUNTER
I can't find which orthopedic office accepted her. She is asking for this number as well, not just Dr. Alston. Did she get accepted to a [place] or a [place] orthopedic?

## 2018-01-30 NOTE — TELEPHONE ENCOUNTER
Called and spoke with pt. Pt appt with the pain clinic is tomorrow 01/31/2018.    Can we refer pt to [facility] for them to consult on her feet?    I don't see anywhere where pt was referred to /[facility] or [place].

## 2018-01-30 NOTE — TELEPHONE ENCOUNTER
Called and spoke with our referral coordinator and he aked if we can please put in a new referral for the patient.  He stated he sent it over to [facility] and he is not sure why they did not call patient.  Please put in the new referral and he will send it and follow up on it.

## 2018-01-30 NOTE — TELEPHONE ENCOUNTER
Called patient and she was upset and crying.  She would like a call back from our referral specialists regarding her orthopaedic appointment.     I gave patient phone number to doctor Alston and explain to patient that I will send a message to our referral specialist.

## 2018-01-31 ENCOUNTER — OFFICE VISIT (OUTPATIENT)
Dept: PHYSICAL MEDICINE AND REHAB | Facility: MEDICAL CENTER | Age: 38
End: 2018-01-31
Payer: MEDICAID

## 2018-01-31 VITALS
DIASTOLIC BLOOD PRESSURE: 70 MMHG | SYSTOLIC BLOOD PRESSURE: 128 MMHG | HEART RATE: 87 BPM | WEIGHT: 257 LBS | TEMPERATURE: 97.8 F | BODY MASS INDEX: 35.98 KG/M2 | OXYGEN SATURATION: 96 % | HEIGHT: 71 IN

## 2018-01-31 DIAGNOSIS — F33.9 EPISODE OF RECURRENT MAJOR DEPRESSIVE DISORDER, UNSPECIFIED DEPRESSION EPISODE SEVERITY (HCC): ICD-10-CM

## 2018-01-31 DIAGNOSIS — E66.9 OBESITY (BMI 35.0-39.9 WITHOUT COMORBIDITY): ICD-10-CM

## 2018-01-31 DIAGNOSIS — G89.4 CHRONIC PAIN DISORDER: Chronic | ICD-10-CM

## 2018-01-31 DIAGNOSIS — M87.00 AVASCULAR NECROSIS OF BONE (HCC): Primary | ICD-10-CM

## 2018-01-31 PROCEDURE — 99244 OFF/OP CNSLTJ NEW/EST MOD 40: CPT | Performed by: PHYSICAL MEDICINE & REHABILITATION
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
## 2018-02-06 ENCOUNTER — OFFICE VISIT (OUTPATIENT)
Dept: RHEUMATOLOGY | Facility: PHYSICIAN GROUP | Age: 38
End: 2018-02-06
Payer: MEDICAID

## 2018-02-06 VITALS
HEART RATE: 101 BPM | TEMPERATURE: 98.4 F | OXYGEN SATURATION: 94 % | RESPIRATION RATE: 14 BRPM | SYSTOLIC BLOOD PRESSURE: 120 MMHG | WEIGHT: 253 LBS | DIASTOLIC BLOOD PRESSURE: 80 MMHG | BODY MASS INDEX: 36.22 KG/M2 | HEIGHT: 70 IN

## 2018-02-06 DIAGNOSIS — M87.00 AVASCULAR NECROSIS OF BONE (HCC): ICD-10-CM

## 2018-02-06 PROCEDURE — 99205 OFFICE O/P NEW HI 60 MIN: CPT | Performed by: INTERNAL MEDICINE

## 2018-02-06 RX ORDER — BUSPIRONE HYDROCHLORIDE 10 MG/1
10 TABLET ORAL 2 TIMES DAILY
COMMUNITY
End: 2018-06-22

## 2018-02-06 RX ORDER — CYCLOBENZAPRINE HCL 10 MG
10 TABLET ORAL 3 TIMES DAILY PRN
COMMUNITY
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
## 2018-02-08 ENCOUNTER — TELEPHONE (OUTPATIENT)
Dept: ENDOCRINOLOGY | Facility: MEDICAL CENTER | Age: 38
End: 2018-02-08

## 2018-02-08 NOTE — TELEPHONE ENCOUNTER
Recived a call from patient stating she was told from the test company to take only one test out of 6, but did not specify which one. The insurance rep is stating the test will cost about $2,500. Patient is requesting the provider to contact her to go over her concerns.

## 2018-02-12 ENCOUNTER — TELEPHONE (OUTPATIENT)
Dept: ENDOCRINOLOGY | Facility: MEDICAL CENTER | Age: 38
End: 2018-02-12

## 2018-02-12 NOTE — TELEPHONE ENCOUNTER
Talked to patient. Will discuss with everette from [employer] regarding the coverage of genetic testing for hypophosphatasia. I was informed that test was free of cost.

## 2018-02-15 ENCOUNTER — TELEPHONE (OUTPATIENT)
Dept: ENDOCRINOLOGY | Facility: MEDICAL CENTER | Age: 38
End: 2018-02-15

## 2018-02-15 NOTE — TELEPHONE ENCOUNTER
Spoke to patient and advised her that genetic testing for HPP is going to be free of cost to her. I have verified this with drug rep from [employer] ( the company who does it).

## 2018-02-28 ENCOUNTER — OFFICE VISIT (OUTPATIENT)
Dept: ENDOCRINOLOGY | Facility: MEDICAL CENTER | Age: 38
End: 2018-02-28
Payer: MEDICAID

## 2018-02-28 VITALS
HEIGHT: 70 IN | BODY MASS INDEX: 37.37 KG/M2 | WEIGHT: 261 LBS | HEART RATE: 105 BPM | DIASTOLIC BLOOD PRESSURE: 78 MMHG | OXYGEN SATURATION: 95 % | SYSTOLIC BLOOD PRESSURE: 126 MMHG

## 2018-02-28 DIAGNOSIS — M87.00 AVASCULAR NECROSIS OF BONE (HCC): ICD-10-CM

## 2018-02-28 DIAGNOSIS — E83.39 JUVENILE HYPOPHOSPHATASIA: ICD-10-CM

## 2018-02-28 PROCEDURE — 99213 OFFICE O/P EST LOW 20 MIN: CPT | Performed by: INTERNAL MEDICINE

## 2018-03-01 NOTE — PROGRESS NOTES
Endocrinology Clinic Progress Note  PCP: AYESHA Johnson    HPI:  Vivian Kaur is a 37 y.o. old patient who comes in today for review of endocrine problems.  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 There has been some confusion regarding the genetic testing for hypophosphatasia. The [employer] company continues to tell her that she would have to pay somewhre between $2000-$4000 for this testing while the drug representative continues to insist that this is a free test offered by the company. I've asked the patient to call the drug rep directly to clarify this and get the testing done as soon as possible.    ROS:  Constitutional: No unintentional weight loss  Endo: Denies excessive thirst or frequent urination  All other systems were reviewed and were negative.    Past Medical History:  Patient Active Problem List    Diagnosis Date Noted   • Chronic pain disorder 01/31/2018   • Obesity (BMI 35.0-39.9 without comorbidity) 08/21/2017   • Major depressive disorder 08/21/2017   • Avascular necrosis of bone (CMS-HCC) 02/01/2017       Medications:    Current Outpatient Prescriptions:   •  cyclobenzaprine (FLEXERIL) 10 MG Tab, Take 10 mg by mouth 3 times a day as needed., Disp: , Rfl:   •  busPIRone (BUSPAR) 10 MG Tab, Take 10 mg by mouth 2 times a day., Disp: , Rfl:   •  Milk Thistle 1000 MG Cap, Take 1 Tab by mouth every day., Disp: , Rfl:   •  B Complex-Biotin-FA (B COMPLETE PO), Take  by mouth., Disp: , Rfl:   •  Cholecalciferol (VITAMIN D3) 5000 units Tab, Take  by mouth., Disp: , Rfl:   •  Magnesium 100 MG Tab, Take  by mouth., Disp: , Rfl:   •  coenzyme Q-10 30 MG capsule, Take 60 mg by mouth every day., Disp: , Rfl:   •  oxyCODONE CR (OXYCONTIN) 40 MG Tablet Extended Release 12 hour Abuse-Deterrent tablet, Take 40 mg by mouth every 12 hours., Disp: , Rfl:   •  Furosemide (LASIX PO), Take  by mouth., Disp: , Rfl:   •  VOLTAREN 1 % Gel, Apply 1 Tube to  "affected area(s) 3 times a day as needed., Disp: , Rfl:   •  folic acid (FOLVITE) 1 MG Tab, Take 1 Tab by mouth every day., Disp: 90 Tab, Rfl: 3  •  aspirin EC (ECOTRIN) 81 MG Tablet Delayed Response, Take 1 Tab by mouth every day., Disp: 30 Tab, Rfl: 6  •  prochlorperazine (COMPAZINE) 10 MG Tab, , Disp: , Rfl:   •  oxycodone-acetaminophen (PERCOCET-10)  MG Tab, Take 1-2 Tabs by mouth every four hours as needed for Severe Pain., Disp: , Rfl:   •  alprazolam (XANAX) 2 MG tablet, Take 2 mg by mouth at bedtime as needed for Sleep., Disp: , Rfl:   •  alendronate (FOSAMAX) 70 MG Tab, Take 1 Tab by mouth every 7 days., Disp: 8 Tab, Rfl: 3    Labs: Reviewed    Physical Examination:  Vital signs: /78   Pulse (!) 105   Ht 1.778 m (5' 10\")   Wt 118.4 kg (261 lb)   SpO2 95%   BMI 37.45 kg/m²  Body mass index is 37.45 kg/m².  General: No apparent distress, cooperative  Eyes: No scleral icterus, no discharge, normal eyelids  Neck: No abnormal masses on inspection, normal thyroid exam  Resp: Normal effort, clear to auscultation bilaterally  CVS: Regular rate and rhythm, S1 S2 normal, no murmur  Extremities: No lower extremity edema  Abdomen: abdominal obesity present  Musculoskeletal: Normal digits and nails  Skin: No rash on visible skin  Psych: Alert and oriented, normal mood and affect, intact memory and able to make informed decisions.    Assessment and Plan:    1. Avascular necrosis of bone (CMS-HCC)  To get the testing for the juvenile hypophosphatasia done as soon as possible.    2. Juvenile hypophosphatasia  Plan as per above.      Return in about 3 months (around 5/28/2018).    Thank you for allowing me to participate in the care of this patient.    Vincent Al M.D.    CC:   [email or web address].    This note was created using voice recognition software (Dragon). The accuracy of the dictation is limited by the abilities of the software. I have reviewed the note prior to signing, however some " errors in grammar and context are still possible. If you have any questions related to this note please do not hesitate to contact our office.

## 2018-03-15 ENCOUNTER — TELEPHONE (OUTPATIENT)
Dept: ENDOCRINOLOGY | Facility: MEDICAL CENTER | Age: 38
End: 2018-03-15

## 2018-03-15 NOTE — TELEPHONE ENCOUNTER
Received a call from patient stating she need a test recreation form for her test. Patient stated it must be completed by the Md.
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
## 2018-03-19 ENCOUNTER — TELEPHONE (OUTPATIENT)
Dept: RHEUMATOLOGY | Facility: PHYSICIAN GROUP | Age: 38
End: 2018-03-19

## 2018-03-19 ENCOUNTER — OFFICE VISIT (OUTPATIENT)
Dept: RHEUMATOLOGY | Facility: PHYSICIAN GROUP | Age: 38
End: 2018-03-19
Payer: MEDICAID

## 2018-03-19 VITALS
DIASTOLIC BLOOD PRESSURE: 90 MMHG | RESPIRATION RATE: 12 BRPM | HEIGHT: 70 IN | TEMPERATURE: 98.2 F | WEIGHT: 278 LBS | OXYGEN SATURATION: 93 % | HEART RATE: 105 BPM | SYSTOLIC BLOOD PRESSURE: 130 MMHG | BODY MASS INDEX: 39.8 KG/M2

## 2018-03-19 DIAGNOSIS — M87.00 AVASCULAR NECROSIS OF BONE (HCC): ICD-10-CM

## 2018-03-19 DIAGNOSIS — R76.8 POSITIVE ANA (ANTINUCLEAR ANTIBODY): ICD-10-CM

## 2018-03-19 PROCEDURE — 99214 OFFICE O/P EST MOD 30 MIN: CPT | Performed by: INTERNAL MEDICINE

## 2018-03-19 ASSESSMENT — PAIN SCALES - GENERAL: PAINLEVEL: 7=MODERATE-SEVERE PAIN

## 2018-03-19 ASSESSMENT — ENCOUNTER SYMPTOMS: MYALGIAS: 1
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
## 2018-03-20 NOTE — TELEPHONE ENCOUNTER
Please fax or mail the labs to the patient or fax to [facility].  I left the labs in my outbox.  If we mail - mail via certified mail to the patient.  Please let patient know.

## 2018-05-01 ENCOUNTER — TELEPHONE (OUTPATIENT)
Dept: ENDOCRINOLOGY | Facility: MEDICAL CENTER | Age: 38
End: 2018-05-01

## 2018-05-30 ENCOUNTER — OFFICE VISIT (OUTPATIENT)
Dept: ENDOCRINOLOGY | Facility: MEDICAL CENTER | Age: 38
End: 2018-05-30
Payer: MEDICAID

## 2018-05-30 VITALS
DIASTOLIC BLOOD PRESSURE: 82 MMHG | WEIGHT: 254 LBS | BODY MASS INDEX: 36.36 KG/M2 | HEART RATE: 114 BPM | HEIGHT: 70 IN | SYSTOLIC BLOOD PRESSURE: 128 MMHG | OXYGEN SATURATION: 95 %

## 2018-05-30 DIAGNOSIS — M80.079G: ICD-10-CM

## 2018-05-30 DIAGNOSIS — M87.00 AVASCULAR NECROSIS OF BONE (HCC): ICD-10-CM

## 2018-05-30 PROCEDURE — 99214 OFFICE O/P EST MOD 30 MIN: CPT | Performed by: INTERNAL MEDICINE

## 2018-05-30 NOTE — PROGRESS NOTES
Endocrinology Clinic Progress Note  PCP: AYESHA Johnson    HPI:  Vivian Kaur is a 37 y.o. old patient who comes in today for review of endocrine problems.    She does have pathologic fracture of both feet along with severe osteoporosis and and multiple stress fractures in the bilateral feet.  The workup was negative for hypophosphatasia syndrome.    She currently has nausea and vomiting once a week since last couple of months and not sure if she has some sort of stomach bug. For osteoporosis she tried one dose of Fosamax and had severe heartburn. She stopped fosamax 2 months ago.    ROS:  Constitutional: on and off nausea and vomting, worsening bone loss, No unintentional weight loss  Endo: Denies excessive thirst or frequent urination  All other systems were reviewed and were negative.    Past Medical History:  Patient Active Problem List    Diagnosis Date Noted   • Chronic pain disorder 01/31/2018   • Obesity (BMI 35.0-39.9 without comorbidity) 08/21/2017   • Major depressive disorder 08/21/2017   • Avascular necrosis of bone (HCC) 02/01/2017       Medications:    Current Outpatient Prescriptions:   •  Teriparatide, Recombinant, 600 MCG/2.4ML Solution, Inject 20 mcg as instructed every day., Disp: 1 PEN, Rfl: 11  •  aspirin 81 MG EC tablet, TAKE ONE TABLET BY MOUTH ONCE DAILY, Disp: 90 Tab, Rfl: 3  •  cyclobenzaprine (FLEXERIL) 10 MG Tab, Take 10 mg by mouth 3 times a day as needed., Disp: , Rfl:   •  busPIRone (BUSPAR) 10 MG Tab, Take 10 mg by mouth 2 times a day., Disp: , Rfl:   •  Milk Thistle 1000 MG Cap, Take 1 Tab by mouth every day., Disp: , Rfl:   •  B Complex-Biotin-FA (B COMPLETE PO), Take  by mouth., Disp: , Rfl:   •  Cholecalciferol (VITAMIN D3) 5000 units Tab, Take  by mouth., Disp: , Rfl:   •  Magnesium 100 MG Tab, Take  by mouth., Disp: , Rfl:   •  coenzyme Q-10 30 MG capsule, Take 60 mg by mouth every day., Disp: , Rfl:   •  oxyCODONE CR (OXYCONTIN) 40 MG Tablet Extended Release  "12 hour Abuse-Deterrent tablet, Take 40 mg by mouth every 12 hours., Disp: , Rfl:   •  Furosemide (LASIX PO), Take  by mouth., Disp: , Rfl:   •  VOLTAREN 1 % Gel, Apply 1 Tube to affected area(s) 3 times a day as needed., Disp: , Rfl:   •  folic acid (FOLVITE) 1 MG Tab, Take 1 Tab by mouth every day., Disp: 90 Tab, Rfl: 3  •  prochlorperazine (COMPAZINE) 10 MG Tab, , Disp: , Rfl:   •  oxycodone-acetaminophen (PERCOCET-10)  MG Tab, Take 1-2 Tabs by mouth every four hours as needed for Severe Pain., Disp: , Rfl:   •  alprazolam (XANAX) 2 MG tablet, Take 2 mg by mouth at bedtime as needed for Sleep., Disp: , Rfl:     Labs: Reviewed    Physical Examination:  Vital signs: /82   Pulse (!) 114   Ht 1.778 m (5' 10\")   Wt 115.2 kg (254 lb)   SpO2 95%   BMI 36.45 kg/m²  Body mass index is 36.45 kg/m².  General: No apparent distress, cooperative  Eyes: No scleral icterus, no discharge, normal eyelids  Neck: No abnormal masses on inspection, normal thyroid exam  Resp: Normal effort, clear to auscultation bilaterally  CVS: Regular rate and rhythm, S1 S2 normal, no murmur  Extremities: No lower extremity edema  Abdomen: abdominal obesity present  Musculoskeletal: Normal digits and nails  Skin: No rash on visible skin  Psych: Alert and oriented, normal mood and affect, intact memory and able to make informed decisions.    Assessment and Plan:    1. Avascular necrosis of bone (HCC)  Start   - Teriparatide, Recombinant, 600 MCG/2.4ML Solution; Inject 20 mcg as instructed every day.  Dispense: 1 PEN; Refill: 11  Work up negative for hypophosphatasia syndrome.     2. Pathological fracture of foot due to osteoporosis with delayed healing, unspecified laterality, unspecified osteoporosis type, subsequent encounter  start  - Teriparatide, Recombinant, 600 MCG/2.4ML Solution; Inject 20 mcg as instructed every day.  Dispense: 1 PEN; Refill: 11    Has not tolerated fosamax due to severe GI side effects and she does not want " to try IV reclast because of the fear of GI side effects lasting for upto 1 year.     Work up negative for hypophosphatasia syndrome.     Return in about 3 months (around 8/30/2018).    Thank you for allowing me to participate in the care of this patient.    Vincent Al M.D.    CC:   SAADIA Johnson.[email or web address].    This note was created using voice recognition software (Dragon). The accuracy of the dictation is limited by the abilities of the software. I have reviewed the note prior to signing, however some errors in grammar and context are still possible. If you have any questions related to this note please do not hesitate to contact our office.

## 2018-06-06 ENCOUNTER — APPOINTMENT (OUTPATIENT)
Dept: RHEUMATOLOGY | Facility: PHYSICIAN GROUP | Age: 38
End: 2018-06-06
Payer: MEDICAID

## 2018-06-22 ENCOUNTER — OFFICE VISIT (OUTPATIENT)
Dept: PHYSICAL MEDICINE AND REHAB | Facility: MEDICAL CENTER | Age: 38
End: 2018-06-22
Payer: MEDICAID

## 2018-06-22 VITALS
OXYGEN SATURATION: 93 % | WEIGHT: 269 LBS | HEIGHT: 70 IN | SYSTOLIC BLOOD PRESSURE: 138 MMHG | BODY MASS INDEX: 38.51 KG/M2 | HEART RATE: 107 BPM | DIASTOLIC BLOOD PRESSURE: 98 MMHG | TEMPERATURE: 98.4 F

## 2018-06-22 DIAGNOSIS — G89.4 CHRONIC PAIN DISORDER: ICD-10-CM

## 2018-06-22 DIAGNOSIS — M87.00 AVASCULAR NECROSIS OF BONE (HCC): ICD-10-CM

## 2018-06-22 DIAGNOSIS — F11.90 CHRONIC, CONTINUOUS USE OF OPIOIDS: ICD-10-CM

## 2018-06-22 PROCEDURE — 99214 OFFICE O/P EST MOD 30 MIN: CPT | Performed by: PHYSICAL MEDICINE & REHABILITATION

## 2018-06-22 RX ORDER — OXYCODONE HCL 40 MG/1
40 TABLET, FILM COATED, EXTENDED RELEASE ORAL EVERY 12 HOURS
Qty: 60 EACH | Refills: 0 | Status: SHIPPED | OUTPATIENT
Start: 2018-06-22 | End: 2018-07-22

## 2018-06-22 RX ORDER — PILOCARPINE HYDROCHLORIDE 5 MG/1
TABLET, FILM COATED ORAL
COMMUNITY
Start: 2018-05-24

## 2018-06-22 RX ORDER — LITHIUM CARBONATE 300 MG/1
CAPSULE ORAL
COMMUNITY
Start: 2018-05-24

## 2018-06-22 RX ORDER — BUSPIRONE HYDROCHLORIDE 15 MG/1
TABLET ORAL
COMMUNITY
Start: 2018-05-24 | End: 2018-06-22

## 2018-06-22 RX ORDER — DOXEPIN HYDROCHLORIDE 25 MG/1
CAPSULE ORAL
COMMUNITY
Start: 2018-05-24

## 2018-06-22 ASSESSMENT — PAIN SCALES - GENERAL: PAINLEVEL: 6=MODERATE PAIN
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
## 2018-07-24 ENCOUNTER — OFFICE VISIT (OUTPATIENT)
Dept: ENDOCRINOLOGY | Facility: MEDICAL CENTER | Age: 38
End: 2018-07-24
Payer: MEDICAID

## 2018-07-24 ENCOUNTER — OFFICE VISIT (OUTPATIENT)
Dept: PHYSICAL MEDICINE AND REHAB | Facility: MEDICAL CENTER | Age: 38
End: 2018-07-24
Payer: MEDICAID

## 2018-07-24 VITALS
HEIGHT: 71 IN | DIASTOLIC BLOOD PRESSURE: 84 MMHG | BODY MASS INDEX: 37.5 KG/M2 | SYSTOLIC BLOOD PRESSURE: 138 MMHG | OXYGEN SATURATION: 93 % | WEIGHT: 267.86 LBS | TEMPERATURE: 98.6 F | HEART RATE: 101 BPM

## 2018-07-24 VITALS
OXYGEN SATURATION: 97 % | WEIGHT: 268 LBS | SYSTOLIC BLOOD PRESSURE: 128 MMHG | BODY MASS INDEX: 37.52 KG/M2 | DIASTOLIC BLOOD PRESSURE: 80 MMHG | HEIGHT: 71 IN | HEART RATE: 112 BPM

## 2018-07-24 DIAGNOSIS — M87.00 AVASCULAR NECROSIS OF BONE (HCC): ICD-10-CM

## 2018-07-24 DIAGNOSIS — R11.0 NAUSEA: ICD-10-CM

## 2018-07-24 DIAGNOSIS — E66.9 OBESITY (BMI 35.0-39.9 WITHOUT COMORBIDITY): ICD-10-CM

## 2018-07-24 DIAGNOSIS — M80.079G: ICD-10-CM

## 2018-07-24 DIAGNOSIS — G89.4 CHRONIC PAIN DISORDER: ICD-10-CM

## 2018-07-24 DIAGNOSIS — F11.90 CHRONIC, CONTINUOUS USE OF OPIOIDS: ICD-10-CM

## 2018-07-24 PROCEDURE — 99214 OFFICE O/P EST MOD 30 MIN: CPT | Performed by: INTERNAL MEDICINE

## 2018-07-24 PROCEDURE — 99214 OFFICE O/P EST MOD 30 MIN: CPT | Performed by: PHYSICAL MEDICINE & REHABILITATION

## 2018-07-24 RX ORDER — OXYCODONE HCL 40 MG/1
40 TABLET, FILM COATED, EXTENDED RELEASE ORAL EVERY 12 HOURS
Qty: 60 TAB | Refills: 0 | Status: SHIPPED | OUTPATIENT
Start: 2018-07-24 | End: 2018-08-21

## 2018-07-24 RX ORDER — OXYCODONE HCL 40 MG/1
40 TABLET, FILM COATED, EXTENDED RELEASE ORAL EVERY 12 HOURS
COMMUNITY
Start: 2018-07-24 | End: 2018-08-21

## 2018-07-24 ASSESSMENT — PAIN SCALES - GENERAL: PAINLEVEL: 7=MODERATE-SEVERE PAIN
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
## 2018-07-24 NOTE — PROGRESS NOTES
Endocrinology Clinic Progress Note  PCP: AYESHA Johnson    HPI:  Vivian Kaur is a 38 y.o. old patient who comes in today for review of endocrine problems. She does have pathologic fracture of both feet along with severe osteoporosis and and multiple stress fractures in her feet bilaterally. The workup was negative for hypophosphatasia syndrome.  She has not started on the Teriparatide Recombinant yet due to waiting for Medicaid to approve.  She is having dry heaving one to four times/week and has been going off her medications to figure out what is causing it.  She has gained 100 pounds and is only eating one meal daily.  She is having a lot of pain in the feet, knees, and hips.  What ever she is using the most is what is hurting.  ROS:  Constitutional: No unintentional weight loss  Endo: Denies excessive thirst or frequent urination  All other systems were reviewed and were negative.    Past Medical History:  Patient Active Problem List    Diagnosis Date Noted   • Chronic pain disorder 01/31/2018   • Obesity (BMI 35.0-39.9 without comorbidity) 08/21/2017   • Major depressive disorder 08/21/2017   • Avascular necrosis of bone (HCC) 02/01/2017       Medications:    Current Outpatient Prescriptions:   •  oxyCODONE CR (OXYCONTIN) 40 MG Tablet Extended Release 12 hour Abuse-Deterrent tablet, Take 1 Tab by mouth every 12 hours for 30 days., Disp: 60 Tab, Rfl: 0  •  NEXIUM 40 MG delayed-release capsule, , Disp: , Rfl:   •  oxyCODONE CR (OXYCONTIN) 40 MG Tablet Extended Release 12 hour Abuse-Deterrent tablet, Take 40 mg by mouth every 12 hours., Disp: , Rfl:   •  doxepin (SINEQUAN) 25 MG Cap, , Disp: , Rfl:   •  lithium carbonate 300 MG capsule, , Disp: , Rfl:   •  pilocarpine (SALAGEN) 5 MG Tab, , Disp: , Rfl:   •  Teriparatide, Recombinant, 600 MCG/2.4ML Solution, Inject 20 mcg as instructed every day., Disp: 1 PEN, Rfl: 11  •  aspirin 81 MG EC tablet, TAKE ONE TABLET BY MOUTH ONCE DAILY, Disp: 90 Tab,  "Rfl: 3  •  cyclobenzaprine (FLEXERIL) 10 MG Tab, Take 10 mg by mouth 3 times a day as needed., Disp: , Rfl:   •  Milk Thistle 1000 MG Cap, Take 1 Tab by mouth every day., Disp: , Rfl:   •  B Complex-Biotin-FA (B COMPLETE PO), Take  by mouth., Disp: , Rfl:   •  Cholecalciferol (VITAMIN D3) 5000 units Tab, Take  by mouth., Disp: , Rfl:   •  Magnesium 100 MG Tab, Take  by mouth., Disp: , Rfl:   •  coenzyme Q-10 30 MG capsule, Take 60 mg by mouth every day., Disp: , Rfl:   •  VOLTAREN 1 % Gel, Apply 1 Tube to affected area(s) 3 times a day as needed., Disp: , Rfl:   •  folic acid (FOLVITE) 1 MG Tab, Take 1 Tab by mouth every day., Disp: 90 Tab, Rfl: 3  •  prochlorperazine (COMPAZINE) 10 MG Tab, , Disp: , Rfl:     Labs: Reviewed    Physical Examination:  Vital signs: /80   Pulse (!) 112   Ht 1.803 m (5' 11\")   Wt 121.6 kg (268 lb)   SpO2 97%   BMI 37.38 kg/m²  Body mass index is 37.38 kg/m². Patient's body mass index is 37.38 kg/m². Exercise and nutrition counseling were performed at this visit.  Any physical activity causes her a lot of pain.  General: No apparent distress, cooperative  Eyes: No scleral icterus, no discharge, normal eyelids  Neck: No abnormal masses on inspection, normal thyroid exam  Resp: Normal effort, clear to auscultation bilaterally  CVS: Regular rate and rhythm, S1 S2 normal, no murmur  Extremities: No lower extremity edema  Abdomen: abdominal obesity present  Musculoskeletal: Normal digits and nails  Skin: No rash on visible skin  Psych: Alert and oriented, normal mood and affect, intact memory and able to make informed decisions.    Assessment and Plan:    1. Avascular necrosis of bone (HCC)  Worsening with worsening osteoporosis and pathology fractures.    2. Pathological fracture of foot due to osteoporosis with delayed healing, unspecified laterality, unspecified osteoporosis type, subsequent encounter  She clearly has osteoporosis with pathologic fracture.  I am not sure why her " insurance is not covering Forteo therapy.  One sample pen of Forteo was given to the patient.  First dose given in the office.  Patient to apply to Forteo connect program to see if she can get some help.    Return in about 3 months (around 10/24/2018).    Thank you for allowing me to participate in the care of this patient.    Vincent Al M.D.    CC:   [email or web address].    This note was created using voice recognition software (Dragon). The accuracy of the dictation is limited by the abilities of the software. I have reviewed the note prior to signing, however some errors in grammar and context are still possible. If you have any questions related to this note please do not hesitate to contact our office.

## 2018-07-25 ENCOUNTER — APPOINTMENT (OUTPATIENT)
Dept: ENDOCRINOLOGY | Facility: MEDICAL CENTER | Age: 38
End: 2018-07-25
Payer: MEDICAID

## 2018-07-25 ENCOUNTER — TELEPHONE (OUTPATIENT)
Dept: ENDOCRINOLOGY | Facility: MEDICAL CENTER | Age: 38
End: 2018-07-25

## 2018-07-30 ASSESSMENT — ENCOUNTER SYMPTOMS: MYALGIAS: 1
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
## 2018-07-31 ENCOUNTER — OFFICE VISIT (OUTPATIENT)
Dept: RHEUMATOLOGY | Facility: PHYSICIAN GROUP | Age: 38
End: 2018-07-31
Payer: MEDICAID

## 2018-07-31 VITALS
WEIGHT: 265 LBS | SYSTOLIC BLOOD PRESSURE: 130 MMHG | BODY MASS INDEX: 36.96 KG/M2 | HEART RATE: 107 BPM | DIASTOLIC BLOOD PRESSURE: 80 MMHG | TEMPERATURE: 98.1 F | RESPIRATION RATE: 14 BRPM | OXYGEN SATURATION: 93 %

## 2018-07-31 DIAGNOSIS — M87.00 AVASCULAR NECROSIS OF BONE (HCC): ICD-10-CM

## 2018-07-31 DIAGNOSIS — R76.8 POSITIVE ANA (ANTINUCLEAR ANTIBODY): ICD-10-CM

## 2018-07-31 DIAGNOSIS — I78.1 TELANGIECTASIA: ICD-10-CM

## 2018-07-31 PROCEDURE — 99213 OFFICE O/P EST LOW 20 MIN: CPT | Performed by: INTERNAL MEDICINE
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
## 2018-08-21 ENCOUNTER — APPOINTMENT (OUTPATIENT)
Dept: RHEUMATOLOGY | Facility: PHYSICIAN GROUP | Age: 38
End: 2018-08-21
Payer: MEDICAID

## 2018-08-21 ENCOUNTER — OFFICE VISIT (OUTPATIENT)
Dept: PHYSICAL MEDICINE AND REHAB | Facility: MEDICAL CENTER | Age: 38
End: 2018-08-21
Payer: MEDICAID

## 2018-08-21 VITALS
HEART RATE: 100 BPM | SYSTOLIC BLOOD PRESSURE: 138 MMHG | WEIGHT: 259.7 LBS | BODY MASS INDEX: 36.36 KG/M2 | OXYGEN SATURATION: 91 % | HEIGHT: 71 IN | DIASTOLIC BLOOD PRESSURE: 96 MMHG | TEMPERATURE: 98.6 F

## 2018-08-21 DIAGNOSIS — F41.9 ANXIETY: ICD-10-CM

## 2018-08-21 DIAGNOSIS — F11.90 CHRONIC, CONTINUOUS USE OF OPIOIDS: ICD-10-CM

## 2018-08-21 DIAGNOSIS — M87.00 AVASCULAR NECROSIS OF BONE (HCC): ICD-10-CM

## 2018-08-21 DIAGNOSIS — F43.10 PTSD (POST-TRAUMATIC STRESS DISORDER): ICD-10-CM

## 2018-08-21 DIAGNOSIS — G89.4 CHRONIC PAIN DISORDER: ICD-10-CM

## 2018-08-21 PROCEDURE — 99214 OFFICE O/P EST MOD 30 MIN: CPT | Performed by: PHYSICAL MEDICINE & REHABILITATION

## 2018-08-21 RX ORDER — OXYCODONE HYDROCHLORIDE AND ACETAMINOPHEN 5; 325 MG/1; MG/1
1 TABLET ORAL EVERY 8 HOURS PRN
Qty: 30 TAB | Refills: 0 | Status: SHIPPED | OUTPATIENT
Start: 2018-08-21 | End: 2018-09-18 | Stop reason: SDUPTHER

## 2018-08-21 RX ORDER — OXYCODONE HYDROCHLORIDE 30 MG/1
20 TABLET, FILM COATED, EXTENDED RELEASE ORAL EVERY 12 HOURS
Qty: 60 EACH | Refills: 0 | Status: SHIPPED | OUTPATIENT
Start: 2018-08-21 | End: 2018-09-18 | Stop reason: SDUPTHER

## 2018-08-21 ASSESSMENT — PAIN SCALES - GENERAL: PAINLEVEL: 6=MODERATE PAIN
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
## 2018-09-18 ENCOUNTER — OFFICE VISIT (OUTPATIENT)
Dept: PHYSICAL MEDICINE AND REHAB | Facility: MEDICAL CENTER | Age: 38
End: 2018-09-18
Payer: MEDICAID

## 2018-09-18 VITALS
WEIGHT: 253.31 LBS | TEMPERATURE: 97.6 F | BODY MASS INDEX: 36.26 KG/M2 | HEART RATE: 100 BPM | OXYGEN SATURATION: 93 % | HEIGHT: 70 IN | SYSTOLIC BLOOD PRESSURE: 138 MMHG | DIASTOLIC BLOOD PRESSURE: 100 MMHG

## 2018-09-18 DIAGNOSIS — M54.50 LUMBAR SPINE PAIN: ICD-10-CM

## 2018-09-18 DIAGNOSIS — M87.00 AVASCULAR NECROSIS (HCC): ICD-10-CM

## 2018-09-18 DIAGNOSIS — M87.00 AVASCULAR NECROSIS OF BONE (HCC): ICD-10-CM

## 2018-09-18 DIAGNOSIS — F11.90 CHRONIC, CONTINUOUS USE OF OPIOIDS: ICD-10-CM

## 2018-09-18 DIAGNOSIS — M53.3 SACRAL BACK PAIN: ICD-10-CM

## 2018-09-18 PROCEDURE — 99214 OFFICE O/P EST MOD 30 MIN: CPT | Performed by: PHYSICAL MEDICINE & REHABILITATION

## 2018-09-18 RX ORDER — OXYCODONE HYDROCHLORIDE 30 MG/1
30 TABLET, FILM COATED, EXTENDED RELEASE ORAL EVERY 12 HOURS
Qty: 60 EACH | Refills: 0 | Status: SHIPPED | OUTPATIENT
Start: 2018-09-20 | End: 2018-10-16 | Stop reason: SDUPTHER

## 2018-09-18 RX ORDER — OXYCODONE HYDROCHLORIDE AND ACETAMINOPHEN 5; 325 MG/1; MG/1
1 TABLET ORAL EVERY 8 HOURS PRN
Qty: 30 TAB | Refills: 0 | Status: SHIPPED | OUTPATIENT
Start: 2018-09-18 | End: 2018-09-18 | Stop reason: SDUPTHER

## 2018-09-18 RX ORDER — OXYCODONE HYDROCHLORIDE AND ACETAMINOPHEN 5; 325 MG/1; MG/1
1 TABLET ORAL EVERY 8 HOURS PRN
Qty: 30 TAB | Refills: 0 | Status: SHIPPED | OUTPATIENT
Start: 2018-09-20 | End: 2018-10-16 | Stop reason: SDUPTHER

## 2018-09-18 RX ORDER — OXYCODONE HYDROCHLORIDE 30 MG/1
30 TABLET, FILM COATED, EXTENDED RELEASE ORAL EVERY 12 HOURS
Qty: 60 EACH | Refills: 0 | Status: SHIPPED | OUTPATIENT
Start: 2018-09-18 | End: 2018-09-18 | Stop reason: SDUPTHER

## 2018-09-18 ASSESSMENT — PAIN SCALES - GENERAL: PAINLEVEL: 8=MODERATE-SEVERE PAIN
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
## 2018-09-20 ENCOUNTER — TELEPHONE (OUTPATIENT)
Dept: PHYSICAL MEDICINE AND REHAB | Facility: MEDICAL CENTER | Age: 38
End: 2018-09-20

## 2018-09-20 NOTE — TELEPHONE ENCOUNTER
Patient called to let us know she refilled her pain medication in [facility] in [place], because [facility] didn't have it until next month also she would like to discuss with you regarding the reflex you did on her Rt leg, she wants a call back to her cell phone [phone].

## 2018-10-10 ENCOUNTER — OFFICE VISIT (OUTPATIENT)
Dept: ENDOCRINOLOGY | Facility: MEDICAL CENTER | Age: 38
End: 2018-10-10
Payer: MEDICAID

## 2018-10-10 VITALS
HEIGHT: 71 IN | WEIGHT: 262 LBS | OXYGEN SATURATION: 92 % | BODY MASS INDEX: 36.68 KG/M2 | RESPIRATION RATE: 14 BRPM | SYSTOLIC BLOOD PRESSURE: 130 MMHG | HEART RATE: 129 BPM | DIASTOLIC BLOOD PRESSURE: 74 MMHG

## 2018-10-10 DIAGNOSIS — E55.9 VITAMIN D DEFICIENCY: ICD-10-CM

## 2018-10-10 DIAGNOSIS — M80.079G: ICD-10-CM

## 2018-10-10 DIAGNOSIS — D35.2 PITUITARY MICROADENOMA (HCC): ICD-10-CM

## 2018-10-10 DIAGNOSIS — R79.89 PATHOLOGIC HIGH SERUM PROLACTIN: ICD-10-CM

## 2018-10-10 DIAGNOSIS — M87.00 AVASCULAR NECROSIS OF BONE (HCC): ICD-10-CM

## 2018-10-10 DIAGNOSIS — R53.83 FATIGUE, UNSPECIFIED TYPE: ICD-10-CM

## 2018-10-10 DIAGNOSIS — E53.8 VITAMIN B 12 DEFICIENCY: ICD-10-CM

## 2018-10-10 PROCEDURE — 99215 OFFICE O/P EST HI 40 MIN: CPT | Performed by: INTERNAL MEDICINE

## 2018-10-10 NOTE — PROGRESS NOTES
Endocrinology Clinic Progress Note  PCP: AYESHA Johnson    HPI:  Vivian Kaur is a 38 y.o. old patient who comes in today for review of endocrine problems.  Osteoporosis with pathologic fractures: Insurance not covering Forteo therapy.  Samples provided.  Asked patient to apply for patient assistance program.    Pituitary microadenoma: With no compressive features.  Reports history of prolactin as high as 40 in the past.  Denies any galactorrhea in the past and currently    ROS:  Constitutional: Numerous pathology fractures, no unintentional weight loss  Endo: Denies excessive thirst or frequent urination  All other systems were reviewed and were negative.    Past Medical History:  Patient Active Problem List    Diagnosis Date Noted   • Chronic pain disorder 01/31/2018   • Obesity (BMI 35.0-39.9 without comorbidity) 08/21/2017   • Major depressive disorder 08/21/2017   • Avascular necrosis of bone (HCC) 02/01/2017       Medications:    Current Outpatient Prescriptions:   •  OxyCODONE HCl ER 30 MG Tablet Extended Release 12 hour Abuse-Deterrent, Take 30 mg by mouth every 12 hours for 30 days., Disp: 60 Each, Rfl: 0  •  oxyCODONE-acetaminophen (PERCOCET) 5-325 MG Tab, Take 1 Tab by mouth every 8 hours as needed for Severe Pain for up to 30 days., Disp: 30 Tab, Rfl: 0  •  NEXIUM 40 MG delayed-release capsule, , Disp: , Rfl:   •  doxepin (SINEQUAN) 25 MG Cap, , Disp: , Rfl:   •  lithium carbonate 300 MG capsule, , Disp: , Rfl:   •  pilocarpine (SALAGEN) 5 MG Tab, , Disp: , Rfl:   •  Teriparatide, Recombinant, 600 MCG/2.4ML Solution, Inject 20 mcg as instructed every day., Disp: 1 PEN, Rfl: 11  •  aspirin 81 MG EC tablet, TAKE ONE TABLET BY MOUTH ONCE DAILY, Disp: 90 Tab, Rfl: 3  •  cyclobenzaprine (FLEXERIL) 10 MG Tab, Take 10 mg by mouth 3 times a day as needed., Disp: , Rfl:   •  Milk Thistle 1000 MG Cap, Take 1 Tab by mouth every day., Disp: , Rfl:   •  B Complex-Biotin-FA (B COMPLETE PO), Take  by  "mouth., Disp: , Rfl:   •  Cholecalciferol (VITAMIN D3) 5000 units Tab, Take  by mouth., Disp: , Rfl:   •  Magnesium 100 MG Tab, Take  by mouth., Disp: , Rfl:   •  coenzyme Q-10 30 MG capsule, Take 60 mg by mouth every day., Disp: , Rfl:   •  VOLTAREN 1 % Gel, Apply 1 Tube to affected area(s) 3 times a day as needed., Disp: , Rfl:   •  folic acid (FOLVITE) 1 MG Tab, Take 1 Tab by mouth every day., Disp: 90 Tab, Rfl: 3  •  prochlorperazine (COMPAZINE) 10 MG Tab, , Disp: , Rfl:     Labs: Reviewed    Physical Examination:  Vital signs: /74 (BP Location: Right arm, Patient Position: Sitting, BP Cuff Size: Large adult)   Pulse (!) 129   Resp 14   Ht 1.803 m (5' 11\")   Wt 118.8 kg (262 lb)   SpO2 92%   BMI 36.54 kg/m²  Body mass index is 36.54 kg/m².  General: No apparent distress, cooperative  Eyes: No scleral icterus, no discharge, normal eyelids  Neck: No abnormal masses on inspection, normal thyroid exam  Resp: Normal effort, clear to auscultation bilaterally  CVS: Regular rate and rhythm, S1 S2 normal, no murmur  Extremities: No lower extremity edema  Abdomen: abdominal obesity present  Musculoskeletal: Normal digits and nails  Skin: No rash on visible skin  Psych: Alert and oriented, normal mood and affect, intact memory and able to make informed decisions.    Assessment and Plan:    1. Pathological fracture of foot due to osteoporosis with delayed healing, unspecified laterality, unspecified osteoporosis type, subsequent encounter  forteo samples provided. To apply for patient assistance program through [employer].    2. Avascular necrosis of bone (HCC)  forteo may help.     3. Pituitary microadenoma (HCC)  Stable.  Will check pituitary function in detail.     4. Fatigue, unspecified type  Rule out hypothyroidism as the contributory factor for fatigue, muscle weakness and hair loss.  Rule out Vitamin D deficiency as the contributory factor for fatigue.   Rule out Vitamin B12 deficiency as the contributory " factor for fatigue.    5. Pathologic high serum prolactin  Repeat prolactin levels.   - PROLACTIN; Future      Return in about 3 months (around 1/10/2019).    Total face to face time spent with patient equals 40 minutes. 25/40 minutes were spent on counseling the patient about the pathophysiology of pituitary-thyroid axis, pituitary-adrenal axis, pituitary-gonadal axis, natural history of thyroid nodules, side effects and benefits of thyroid hormone, testosterone, Vit D and Vit B12 replacement.    Thank you for allowing me to participate in the care of this patient.    Vincent Al M.D.    CC:   Chema Cisse F.N.P.    This note was created using voice recognition software (Dragon). The accuracy of the dictation is limited by the abilities of the software. I have reviewed the note prior to signing, however some errors in grammar and context are still possible. If you have any questions related to this note please do not hesitate to contact our office.

## 2018-10-12 ENCOUNTER — TELEPHONE (OUTPATIENT)
Dept: RHEUMATOLOGY | Facility: MEDICAL CENTER | Age: 38
End: 2018-10-12

## 2018-10-12 DIAGNOSIS — R76.0 LUPUS ANTICOAGULANT POSITIVE: ICD-10-CM

## 2018-10-13 NOTE — TELEPHONE ENCOUNTER
Please call patient    One of her tests came back positive.  I would liek for her to repeat this in 12 weeks (last week of December or January 2019)     RNA polymerase 3 was negative arsenic and urine was 351mcg/g of creatinine  Anticardiolipin antibody IgA/IgG/IgM was negative  Lupus anticoagulant was positive  Antinuclear antibody was negative  Beta-2 glycoprotein IgG/IgM/IgA was negative  Labs were dated 9/26/2018.

## 2018-10-14 ASSESSMENT — ENCOUNTER SYMPTOMS
FEVER: 0
CHILLS: 0

## 2018-10-16 ENCOUNTER — HOSPITAL ENCOUNTER (OUTPATIENT)
Dept: RADIOLOGY | Facility: MEDICAL CENTER | Age: 38
End: 2018-10-16

## 2018-10-16 ENCOUNTER — OFFICE VISIT (OUTPATIENT)
Dept: PHYSICAL MEDICINE AND REHAB | Facility: MEDICAL CENTER | Age: 38
End: 2018-10-16
Payer: MEDICAID

## 2018-10-16 VITALS
TEMPERATURE: 98.4 F | OXYGEN SATURATION: 90 % | HEIGHT: 71 IN | SYSTOLIC BLOOD PRESSURE: 130 MMHG | BODY MASS INDEX: 37.62 KG/M2 | DIASTOLIC BLOOD PRESSURE: 86 MMHG | WEIGHT: 268.74 LBS | HEART RATE: 100 BPM

## 2018-10-16 DIAGNOSIS — F41.9 ANXIETY: ICD-10-CM

## 2018-10-16 DIAGNOSIS — T57.0X4A: ICD-10-CM

## 2018-10-16 DIAGNOSIS — M87.00 AVASCULAR NECROSIS (HCC): ICD-10-CM

## 2018-10-16 DIAGNOSIS — M87.00 AVASCULAR NECROSIS OF BONE (HCC): ICD-10-CM

## 2018-10-16 DIAGNOSIS — G89.4 CHRONIC PAIN DISORDER: ICD-10-CM

## 2018-10-16 DIAGNOSIS — F11.90 CHRONIC, CONTINUOUS USE OF OPIOIDS: ICD-10-CM

## 2018-10-16 PROCEDURE — 99214 OFFICE O/P EST MOD 30 MIN: CPT | Performed by: PHYSICAL MEDICINE & REHABILITATION

## 2018-10-16 RX ORDER — OXYCODONE HYDROCHLORIDE AND ACETAMINOPHEN 5; 325 MG/1; MG/1
1 TABLET ORAL EVERY 8 HOURS PRN
Qty: 30 TAB | Refills: 0 | Status: SHIPPED | OUTPATIENT
Start: 2018-10-16 | End: 2018-10-16 | Stop reason: SDUPTHER

## 2018-10-16 RX ORDER — OXYCODONE HYDROCHLORIDE 30 MG/1
30 TABLET, FILM COATED, EXTENDED RELEASE ORAL EVERY 12 HOURS
Qty: 60 EACH | Refills: 0 | Status: SHIPPED | OUTPATIENT
Start: 2018-10-20 | End: 2018-11-09 | Stop reason: SDUPTHER

## 2018-10-16 RX ORDER — OXYCODONE HYDROCHLORIDE AND ACETAMINOPHEN 5; 325 MG/1; MG/1
1 TABLET ORAL EVERY 8 HOURS PRN
Qty: 30 TAB | Refills: 0 | Status: SHIPPED | OUTPATIENT
Start: 2018-10-20 | End: 2018-11-09 | Stop reason: SDUPTHER

## 2018-10-16 RX ORDER — OXYCODONE HYDROCHLORIDE 30 MG/1
30 TABLET, FILM COATED, EXTENDED RELEASE ORAL EVERY 12 HOURS
Qty: 60 EACH | Refills: 0 | Status: SHIPPED | OUTPATIENT
Start: 2018-10-16 | End: 2018-10-16 | Stop reason: SDUPTHER

## 2018-10-16 ASSESSMENT — PAIN SCALES - GENERAL: PAINLEVEL: 8=MODERATE-SEVERE PAIN
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
## 2018-10-16 NOTE — TELEPHONE ENCOUNTER
I called [phone] and LM asking patient to call back. I will relay message to patient and offer to schedule her in Jan with Dr. Almazan. NK

## 2018-10-24 NOTE — TELEPHONE ENCOUNTER
I spoke with patient and relayed message. She states understanding. I faxed orders to [facility] per pt request. She was busy at the time of call, so she said she will call back to schedule with Dr. Almazan. NK

## 2018-11-09 ENCOUNTER — OFFICE VISIT (OUTPATIENT)
Dept: PHYSICAL MEDICINE AND REHAB | Facility: MEDICAL CENTER | Age: 38
End: 2018-11-09
Payer: MEDICAID

## 2018-11-09 VITALS
BODY MASS INDEX: 36.61 KG/M2 | HEART RATE: 100 BPM | OXYGEN SATURATION: 90 % | TEMPERATURE: 98.2 F | DIASTOLIC BLOOD PRESSURE: 90 MMHG | HEIGHT: 70 IN | WEIGHT: 255.73 LBS | SYSTOLIC BLOOD PRESSURE: 130 MMHG

## 2018-11-09 DIAGNOSIS — G89.4 CHRONIC PAIN DISORDER: ICD-10-CM

## 2018-11-09 DIAGNOSIS — M87.00 AVASCULAR NECROSIS OF BONE (HCC): ICD-10-CM

## 2018-11-09 PROCEDURE — 99214 OFFICE O/P EST MOD 30 MIN: CPT | Performed by: PHYSICAL MEDICINE & REHABILITATION

## 2018-11-09 RX ORDER — OXYCODONE HYDROCHLORIDE AND ACETAMINOPHEN 5; 325 MG/1; MG/1
1 TABLET ORAL EVERY 8 HOURS PRN
Qty: 45 TAB | Refills: 0 | Status: SHIPPED | OUTPATIENT
Start: 2018-11-09 | End: 2018-11-09 | Stop reason: SDUPTHER

## 2018-11-09 RX ORDER — OXYCODONE HYDROCHLORIDE 30 MG/1
30 TABLET, FILM COATED, EXTENDED RELEASE ORAL EVERY 12 HOURS
Qty: 60 EACH | Refills: 0 | Status: SHIPPED | OUTPATIENT
Start: 2018-11-19 | End: 2018-12-19

## 2018-11-09 RX ORDER — OXYCODONE HYDROCHLORIDE AND ACETAMINOPHEN 5; 325 MG/1; MG/1
1 TABLET ORAL EVERY 8 HOURS PRN
Qty: 45 TAB | Refills: 0 | Status: SHIPPED | OUTPATIENT
Start: 2018-11-19 | End: 2018-12-19

## 2018-11-09 ASSESSMENT — PAIN SCALES - GENERAL: PAINLEVEL: 6=MODERATE PAIN
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
## 2018-12-14 ENCOUNTER — APPOINTMENT (OUTPATIENT)
Dept: PHYSICAL MEDICINE AND REHAB | Facility: MEDICAL CENTER | Age: 38
End: 2018-12-14
Payer: MEDICAID

## 2019-03-27 ENCOUNTER — TELEPHONE (OUTPATIENT)
Dept: NEUROLOGY | Facility: MEDICAL CENTER | Age: 39
End: 2019-03-27

## 2019-03-27 ENCOUNTER — HOSPITAL ENCOUNTER (OUTPATIENT)
Dept: LAB | Facility: MEDICAL CENTER | Age: 39
End: 2019-03-27
Attending: PSYCHIATRY & NEUROLOGY
Payer: MEDICAID

## 2019-03-27 ENCOUNTER — OFFICE VISIT (OUTPATIENT)
Dept: NEUROLOGY | Facility: MEDICAL CENTER | Age: 39
End: 2019-03-27
Payer: MEDICAID

## 2019-03-27 VITALS
TEMPERATURE: 97.5 F | DIASTOLIC BLOOD PRESSURE: 78 MMHG | SYSTOLIC BLOOD PRESSURE: 126 MMHG | BODY MASS INDEX: 37.12 KG/M2 | HEART RATE: 116 BPM | HEIGHT: 70 IN | OXYGEN SATURATION: 96 % | WEIGHT: 259.3 LBS

## 2019-03-27 DIAGNOSIS — M87.00 AVASCULAR NECROSIS OF BONE (HCC): ICD-10-CM

## 2019-03-27 DIAGNOSIS — E23.7 PITUITARY ABNORMALITY (HCC): ICD-10-CM

## 2019-03-27 DIAGNOSIS — E51.9 VITAMIN B1 DEFICIENCY: ICD-10-CM

## 2019-03-27 DIAGNOSIS — E53.8 B12 DEFICIENCY: ICD-10-CM

## 2019-03-27 DIAGNOSIS — E41 NUTRITIONAL MARASMUS (HCC): ICD-10-CM

## 2019-03-27 PROBLEM — E46 MALNUTRITION (HCC): Status: ACTIVE | Noted: 2019-03-27

## 2019-03-27 LAB
25(OH)D3 SERPL-MCNC: 11 NG/ML (ref 30–100)
CRP SERPL HS-MCNC: 0.4 MG/DL (ref 0–0.75)
ERYTHROCYTE [SEDIMENTATION RATE] IN BLOOD BY WESTERGREN METHOD: 5 MM/HOUR (ref 0–20)
HCYS SERPL-SCNC: 43.17 UMOL/L
LACTATE BLD-SCNC: 1.6 MMOL/L (ref 0.5–2)
MAGNESIUM SERPL-MCNC: 2 MG/DL (ref 1.5–2.5)
PROLACTIN SERPL-MCNC: 15.16 NG/ML (ref 2.8–26)
VIT B12 SERPL-MCNC: 193 PG/ML (ref 211–911)

## 2019-03-27 PROCEDURE — 84630 ASSAY OF ZINC: CPT

## 2019-03-27 PROCEDURE — 82607 VITAMIN B-12: CPT

## 2019-03-27 PROCEDURE — 84207 ASSAY OF VITAMIN B-6: CPT

## 2019-03-27 PROCEDURE — 82306 VITAMIN D 25 HYDROXY: CPT

## 2019-03-27 PROCEDURE — 86140 C-REACTIVE PROTEIN: CPT

## 2019-03-27 PROCEDURE — 83605 ASSAY OF LACTIC ACID: CPT

## 2019-03-27 PROCEDURE — 84425 ASSAY OF VITAMIN B-1: CPT

## 2019-03-27 PROCEDURE — 83090 ASSAY OF HOMOCYSTEINE: CPT

## 2019-03-27 PROCEDURE — 84446 ASSAY OF VITAMIN E: CPT

## 2019-03-27 PROCEDURE — 85652 RBC SED RATE AUTOMATED: CPT

## 2019-03-27 PROCEDURE — 84146 ASSAY OF PROLACTIN: CPT

## 2019-03-27 PROCEDURE — 99205 OFFICE O/P NEW HI 60 MIN: CPT | Mod: 25 | Performed by: PSYCHIATRY & NEUROLOGY

## 2019-03-27 PROCEDURE — 82525 ASSAY OF COPPER: CPT

## 2019-03-27 PROCEDURE — 83921 ORGANIC ACID SINGLE QUANT: CPT

## 2019-03-27 PROCEDURE — 36415 COLL VENOUS BLD VENIPUNCTURE: CPT

## 2019-03-27 PROCEDURE — 84591 ASSAY OF NOS VITAMIN: CPT | Mod: XU

## 2019-03-27 PROCEDURE — 83735 ASSAY OF MAGNESIUM: CPT

## 2019-03-27 RX ORDER — CYANOCOBALAMIN 1000 UG/ML
1000 INJECTION, SOLUTION INTRAMUSCULAR; SUBCUTANEOUS ONCE
Status: COMPLETED | OUTPATIENT
Start: 2019-03-27 | End: 2019-03-27

## 2019-03-27 RX ORDER — THIAMINE HYDROCHLORIDE 100 MG/ML
200 INJECTION, SOLUTION INTRAMUSCULAR; INTRAVENOUS ONCE
Status: COMPLETED | OUTPATIENT
Start: 2019-03-27 | End: 2019-03-27

## 2019-03-27 RX ADMIN — CYANOCOBALAMIN 1000 MCG: 1000 INJECTION, SOLUTION INTRAMUSCULAR; SUBCUTANEOUS at 15:33

## 2019-03-27 RX ADMIN — THIAMINE HYDROCHLORIDE 200 MG: 100 INJECTION, SOLUTION INTRAMUSCULAR; INTRAVENOUS at 15:31

## 2019-03-27 ASSESSMENT — PATIENT HEALTH QUESTIONNAIRE - PHQ9
9. THOUGHTS THAT YOU WOULD BE BETTER OFF DEAD, OR OF HURTING YOURSELF: NOT AT ALL
SUM OF ALL RESPONSES TO PHQ QUESTIONS 1-9: 21
7. TROUBLE CONCENTRATING ON THINGS, SUCH AS READING THE NEWSPAPER OR WATCHING TELEVISION: NEARLY EVERY DAY
1. LITTLE INTEREST OR PLEASURE IN DOING THINGS: NEARLY EVERY DAY
4. FEELING TIRED OR HAVING LITTLE ENERGY: NEARLY EVERY DAY
3. TROUBLE FALLING OR STAYING ASLEEP OR SLEEPING TOO MUCH: NEARLY EVERY DAY
6. FEELING BAD ABOUT YOURSELF - OR THAT YOU ARE A FAILURE OR HAVE LET YOURSELF OR YOUR FAMILY DOWN: NEARLY EVERY DAY
5. POOR APPETITE OR OVEREATING: NEARLY EVERY DAY
2. FEELING DOWN, DEPRESSED, IRRITABLE, OR HOPELESS: NEARLY EVERY DAY
8. MOVING OR SPEAKING SO SLOWLY THAT OTHER PEOPLE COULD HAVE NOTICED. OR THE OPPOSITE, BEING SO FIGETY OR RESTLESS THAT YOU HAVE BEEN MOVING AROUND A LOT MORE THAN USUAL: NOT AT ALL
SUM OF ALL RESPONSES TO PHQ9 QUESTIONS 1 AND 2: 6
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
## 2019-03-28 NOTE — TELEPHONE ENCOUNTER
Results for KRIS ALCANTARA (MRN [id number]) as of 3/27/2019 23:23   Ref. Range 3/27/2019 15:03   Vitamin B12 -True Cobalamin Latest Ref Range: 211 - 911 pg/mL 193 (L)   Homocysteine Latest Ref Range: <11.00 umol/L 43.17 (H)   25-Hydroxy   Vitamin D 25 Latest Ref Range: 30 - 100 ng/mL 11 (L)       Due to Vit B12 deficiency, please take vit B12 1000mcg IM QD for one week, and followed by vit B12 1000mcg IM Qmonth    Oral is fine with me too  Vit B12 1000mcg daily     Between IM and oral, I trust IM better though      Due to Vit D deficiency, please take vit D-3   4000unit    daily

## 2019-03-28 NOTE — TELEPHONE ENCOUNTER
Spoke with pt and advised on documentation below, pt stated that she will do oral Vit B12 1000mcg because it is hard for her to come to office, she also agreed to take Vit D-3 4000 iu daily.

## 2019-03-30 LAB — COPPER SERPL-MCNC: 141 UG/DL (ref 80–155)

## 2019-03-31 LAB
A-TOCOPHEROL VIT E SERPL-MCNC: 4.8 MG/L (ref 5.5–18)
BETA+GAMMA TOCOPHEROL SERPL-MCNC: 1.2 MG/L (ref 0–6)
METHYLMALONATE SERPL-SCNC: 0.18 UMOL/L (ref 0–0.4)
VIT B1 BLD-MCNC: 150 NMOL/L (ref 70–180)
VIT B6 SERPL-MCNC: 18.4 NMOL/L (ref 20–125)
ZINC BLD-MCNC: 611 UG/DL (ref 440–860)

## 2019-04-12 ENCOUNTER — TELEPHONE (OUTPATIENT)
Dept: NEUROLOGY | Facility: MEDICAL CENTER | Age: 39
End: 2019-04-12

## 2019-04-12 LAB — NIACIN SERPL-MCNC: 1.89 UG/ML (ref 0.5–8.45)

## 2019-04-12 NOTE — TELEPHONE ENCOUNTER
Due to Vit B12 deficiency, please take vit B12 1000mcg IM QD for one week, and followed by vit B12 1000mcg IM Qmonth    Oral is fine with me too  Vit B12 1000mcg daily     Between IM and oral, I trust IM better though    ________________________________________________________________________    Vit B6 is low please take  Daily 50 milligrams of vitamin B6 by mouth with multivitamins.  Of note, too much B6 could be toxic to the nerve too  ________________________________________________________________________      Due to Vit D deficiency, please take vit D-3   4000unit    daily         ADVICE FOR VITAMINE    Multi-vitamin and mineral 1-2* AM   Vitamin B12 1000 mcg AM   Daily 50 milligrams of vitamin B6   Vit B1 200mg -250mg daily  Iron 27-28 mg PM with Vitamin C   Vitamin C 500 mg PM with Iron   Calcium citrate with Vitamin D3 2000 unit Daily Take with meals in divided doses   Zinc 10-20 mg AM   ________________________________________________________________________              Results for KRIS ALCANTARA (MRN [id number]) as of 4/12/2019 09:33   Ref. Range 3/27/2019 14:47 3/27/2019 15:03   Vitamin B12 -True Cobalamin Latest Ref Range: 211 - 911 pg/mL  193 (L)   Homocysteine Latest Ref Range: <11.00 umol/L  43.17 (H)   Vitamin B1 Latest Ref Range: 70 - 180 nmol/L 150    Vitamin B3 - Niacin Latest Ref Range: 0.50 - 8.45 ug/mL 1.89    Vitamin B6 Latest Ref Range: 20.0 - 125.0 nmol/L 18.4 (L)    25-Hydroxy   Vitamin D 25 Latest Ref Range: 30 - 100 ng/mL  11 (L)   Alpha-Tocopherol (Vit E) Latest Ref Range: 5.5 - 18.0 mg/L  4.8 (L)   Gamma-Tocopherol (Vit E) Latest Ref Range: 0.0 - 6.0 mg/L  1.2

## 2019-04-17 NOTE — TELEPHONE ENCOUNTER
Spoke to pt and advised her on documentation below -- she had no complaints and verbalized her understanding.

## (undated) DEVICE — CON SEDATION EA ADDL 15 MIN

## (undated) DEVICE — SYRINGE 3 CC 22 GA X 1-1/2 - NDL SAFETY (50/BX 8BX/CA)

## (undated) DEVICE — CANNULA W/ SUPPLY TUBING O2 - (50/CA)

## (undated) DEVICE — SOD. CHL 10CC SYRINGE PREFILL - W/10 CC (30/BX)

## (undated) DEVICE — SYRINGE 6 CC 20 GA X 1 1/2 - NDL SAFETY  (50/BX)

## (undated) DEVICE — CON SEDATION/>5 YR 1ST 15 MIN